# Patient Record
Sex: FEMALE | Race: WHITE | NOT HISPANIC OR LATINO
[De-identification: names, ages, dates, MRNs, and addresses within clinical notes are randomized per-mention and may not be internally consistent; named-entity substitution may affect disease eponyms.]

---

## 2017-02-27 ENCOUNTER — RESULT REVIEW (OUTPATIENT)
Age: 42
End: 2017-02-27

## 2017-02-27 ENCOUNTER — OUTPATIENT (OUTPATIENT)
Dept: OUTPATIENT SERVICES | Facility: HOSPITAL | Age: 42
LOS: 1 days | Discharge: HOME | End: 2017-02-27

## 2017-02-27 DIAGNOSIS — C50.919 MALIGNANT NEOPLASM OF UNSPECIFIED SITE OF UNSPECIFIED FEMALE BREAST: ICD-10-CM

## 2017-02-28 ENCOUNTER — APPOINTMENT (OUTPATIENT)
Dept: HEMATOLOGY ONCOLOGY | Facility: CLINIC | Age: 42
End: 2017-02-28

## 2017-02-28 LAB
HCT VFR BLD CALC: 39.9 % — SIGNIFICANT CHANGE UP (ref 34.5–45)
HGB BLD-MCNC: 14.1 G/DL — SIGNIFICANT CHANGE UP (ref 11.5–15.5)
MCHC RBC-ENTMCNC: 31.2 PG — SIGNIFICANT CHANGE UP (ref 27–34)
MCHC RBC-ENTMCNC: 35.3 G/DL — SIGNIFICANT CHANGE UP (ref 32–36)
MCV RBC AUTO: 88.4 FL — SIGNIFICANT CHANGE UP (ref 80–100)
PLATELET # BLD AUTO: 280 K/UL — SIGNIFICANT CHANGE UP (ref 150–400)
RBC # BLD: 4.51 M/UL — SIGNIFICANT CHANGE UP (ref 3.8–5.2)
RBC # FLD: 12 % — SIGNIFICANT CHANGE UP (ref 10.3–14.5)
WBC # BLD: 5.4 K/UL — SIGNIFICANT CHANGE UP (ref 3.8–10.5)
WBC # FLD AUTO: 5.4 K/UL — SIGNIFICANT CHANGE UP (ref 3.8–10.5)

## 2017-04-04 ENCOUNTER — APPOINTMENT (OUTPATIENT)
Dept: HEMATOLOGY ONCOLOGY | Facility: CLINIC | Age: 42
End: 2017-04-04

## 2017-04-04 ENCOUNTER — OUTPATIENT (OUTPATIENT)
Dept: OUTPATIENT SERVICES | Facility: HOSPITAL | Age: 42
LOS: 1 days | Discharge: HOME | End: 2017-04-04

## 2017-04-04 ENCOUNTER — RESULT REVIEW (OUTPATIENT)
Age: 42
End: 2017-04-04

## 2017-04-04 ENCOUNTER — OTHER (OUTPATIENT)
Age: 42
End: 2017-04-04

## 2017-04-04 DIAGNOSIS — C50.919 MALIGNANT NEOPLASM OF UNSPECIFIED SITE OF UNSPECIFIED FEMALE BREAST: ICD-10-CM

## 2017-04-04 DIAGNOSIS — R74.8 ABNORMAL LEVELS OF OTHER SERUM ENZYMES: ICD-10-CM

## 2017-04-05 ENCOUNTER — APPOINTMENT (OUTPATIENT)
Dept: HEMATOLOGY ONCOLOGY | Facility: CLINIC | Age: 42
End: 2017-04-05

## 2017-04-05 ENCOUNTER — RESULT REVIEW (OUTPATIENT)
Age: 42
End: 2017-04-05

## 2017-04-05 ENCOUNTER — OTHER (OUTPATIENT)
Age: 42
End: 2017-04-05

## 2017-04-05 LAB
ALBUMIN SERPL ELPH-MCNC: 4.5 G/DL
ALP BLD-CCNC: 67 U/L
ALT SERPL-CCNC: 36 U/L
AST SERPL-CCNC: 24 U/L
BILIRUB DIRECT SERPL-MCNC: 0.1 MG/DL
BILIRUB INDIRECT SERPL-MCNC: 0.3 MG/DL
BILIRUB SERPL-MCNC: 0.4 MG/DL
HCT VFR BLD CALC: 39.2 % — SIGNIFICANT CHANGE UP (ref 34.5–45)
HGB BLD-MCNC: 13.8 G/DL — SIGNIFICANT CHANGE UP (ref 11.5–15.5)
MCHC RBC-ENTMCNC: 31.5 PG — SIGNIFICANT CHANGE UP (ref 27–34)
MCHC RBC-ENTMCNC: 35.3 G/DL — SIGNIFICANT CHANGE UP (ref 32–36)
MCV RBC AUTO: 89.4 FL — SIGNIFICANT CHANGE UP (ref 80–100)
PLATELET # BLD AUTO: 272 K/UL — SIGNIFICANT CHANGE UP (ref 150–400)
PROT SERPL-MCNC: 7.2 G/DL
RBC # BLD: 4.38 M/UL — SIGNIFICANT CHANGE UP (ref 3.8–5.2)
RBC # FLD: 11.1 % — SIGNIFICANT CHANGE UP (ref 10.3–14.5)
WBC # BLD: 5.3 K/UL — SIGNIFICANT CHANGE UP (ref 3.8–10.5)
WBC # FLD AUTO: 5.3 K/UL — SIGNIFICANT CHANGE UP (ref 3.8–10.5)

## 2017-04-14 ENCOUNTER — CLINICAL ADVICE (OUTPATIENT)
Age: 42
End: 2017-04-14

## 2017-07-06 ENCOUNTER — OUTPATIENT (OUTPATIENT)
Dept: OUTPATIENT SERVICES | Facility: HOSPITAL | Age: 42
LOS: 1 days | Discharge: HOME | End: 2017-07-06

## 2017-07-06 DIAGNOSIS — C50.919 MALIGNANT NEOPLASM OF UNSPECIFIED SITE OF UNSPECIFIED FEMALE BREAST: ICD-10-CM

## 2017-07-11 ENCOUNTER — APPOINTMENT (OUTPATIENT)
Dept: HEMATOLOGY ONCOLOGY | Facility: CLINIC | Age: 42
End: 2017-07-11

## 2017-07-11 VITALS
OXYGEN SATURATION: 100 % | DIASTOLIC BLOOD PRESSURE: 81 MMHG | BODY MASS INDEX: 26.03 KG/M2 | HEART RATE: 72 BPM | HEIGHT: 64.57 IN | RESPIRATION RATE: 16 BRPM | SYSTOLIC BLOOD PRESSURE: 121 MMHG | TEMPERATURE: 98.4 F | WEIGHT: 154.32 LBS

## 2017-11-15 ENCOUNTER — APPOINTMENT (OUTPATIENT)
Dept: OPHTHALMOLOGY | Facility: CLINIC | Age: 42
End: 2017-11-15

## 2017-12-18 ENCOUNTER — RESULT REVIEW (OUTPATIENT)
Age: 42
End: 2017-12-18

## 2017-12-19 ENCOUNTER — APPOINTMENT (OUTPATIENT)
Dept: OBGYN | Facility: CLINIC | Age: 42
End: 2017-12-19
Payer: COMMERCIAL

## 2017-12-19 PROCEDURE — 99396 PREV VISIT EST AGE 40-64: CPT

## 2018-01-12 ENCOUNTER — OUTPATIENT (OUTPATIENT)
Dept: OUTPATIENT SERVICES | Facility: HOSPITAL | Age: 43
LOS: 1 days | Discharge: HOME | End: 2018-01-12

## 2018-01-12 DIAGNOSIS — C50.919 MALIGNANT NEOPLASM OF UNSPECIFIED SITE OF UNSPECIFIED FEMALE BREAST: ICD-10-CM

## 2018-01-16 ENCOUNTER — RX RENEWAL (OUTPATIENT)
Age: 43
End: 2018-01-16

## 2018-01-19 ENCOUNTER — APPOINTMENT (OUTPATIENT)
Dept: OPHTHALMOLOGY | Facility: CLINIC | Age: 43
End: 2018-01-19
Payer: COMMERCIAL

## 2018-01-19 DIAGNOSIS — H04.123 DRY EYE SYNDROME OF BILATERAL LACRIMAL GLANDS: ICD-10-CM

## 2018-01-19 PROCEDURE — 92014 COMPRE OPH EXAM EST PT 1/>: CPT

## 2018-01-23 ENCOUNTER — APPOINTMENT (OUTPATIENT)
Dept: HEMATOLOGY ONCOLOGY | Facility: CLINIC | Age: 43
End: 2018-01-23
Payer: COMMERCIAL

## 2018-01-23 VITALS
SYSTOLIC BLOOD PRESSURE: 100 MMHG | TEMPERATURE: 98.7 F | BODY MASS INDEX: 26.4 KG/M2 | RESPIRATION RATE: 16 BRPM | DIASTOLIC BLOOD PRESSURE: 70 MMHG | WEIGHT: 156.53 LBS | HEART RATE: 68 BPM | OXYGEN SATURATION: 98 %

## 2018-01-23 PROCEDURE — 99215 OFFICE O/P EST HI 40 MIN: CPT

## 2018-03-09 ENCOUNTER — OUTPATIENT (OUTPATIENT)
Dept: OUTPATIENT SERVICES | Facility: HOSPITAL | Age: 43
LOS: 1 days | Discharge: HOME | End: 2018-03-09

## 2018-03-09 DIAGNOSIS — C50.919 MALIGNANT NEOPLASM OF UNSPECIFIED SITE OF UNSPECIFIED FEMALE BREAST: ICD-10-CM

## 2018-03-12 ENCOUNTER — APPOINTMENT (OUTPATIENT)
Dept: HEMATOLOGY ONCOLOGY | Facility: CLINIC | Age: 43
End: 2018-03-12

## 2018-03-20 ENCOUNTER — APPOINTMENT (OUTPATIENT)
Dept: HEMATOLOGY ONCOLOGY | Facility: CLINIC | Age: 43
End: 2018-03-20

## 2018-03-20 ENCOUNTER — RESULT REVIEW (OUTPATIENT)
Age: 43
End: 2018-03-20

## 2018-03-20 ENCOUNTER — LABORATORY RESULT (OUTPATIENT)
Age: 43
End: 2018-03-20

## 2018-03-20 LAB
HCT VFR BLD CALC: 40.9 % — SIGNIFICANT CHANGE UP (ref 34.5–45)
HGB BLD-MCNC: 14.8 G/DL — SIGNIFICANT CHANGE UP (ref 11.5–15.5)
MCHC RBC-ENTMCNC: 31.5 PG — SIGNIFICANT CHANGE UP (ref 27–34)
MCHC RBC-ENTMCNC: 36.2 G/DL — HIGH (ref 32–36)
MCV RBC AUTO: 87 FL — SIGNIFICANT CHANGE UP (ref 80–100)
PLATELET # BLD AUTO: 270 K/UL — SIGNIFICANT CHANGE UP (ref 150–400)
RBC # BLD: 4.7 M/UL — SIGNIFICANT CHANGE UP (ref 3.8–5.2)
RBC # FLD: 11.1 % — SIGNIFICANT CHANGE UP (ref 10.3–14.5)
WBC # BLD: 4.8 K/UL — SIGNIFICANT CHANGE UP (ref 3.8–10.5)
WBC # FLD AUTO: 4.8 K/UL — SIGNIFICANT CHANGE UP (ref 3.8–10.5)

## 2018-03-26 ENCOUNTER — RESULT REVIEW (OUTPATIENT)
Age: 43
End: 2018-03-26

## 2018-07-18 ENCOUNTER — APPOINTMENT (OUTPATIENT)
Dept: HEMATOLOGY ONCOLOGY | Facility: CLINIC | Age: 43
End: 2018-07-18

## 2018-08-02 ENCOUNTER — OUTPATIENT (OUTPATIENT)
Dept: OUTPATIENT SERVICES | Facility: HOSPITAL | Age: 43
LOS: 1 days | Discharge: HOME | End: 2018-08-02

## 2018-08-02 DIAGNOSIS — C50.919 MALIGNANT NEOPLASM OF UNSPECIFIED SITE OF UNSPECIFIED FEMALE BREAST: ICD-10-CM

## 2018-08-10 ENCOUNTER — APPOINTMENT (OUTPATIENT)
Dept: HEMATOLOGY ONCOLOGY | Facility: CLINIC | Age: 43
End: 2018-08-10
Payer: COMMERCIAL

## 2018-08-10 VITALS
WEIGHT: 137.79 LBS | HEART RATE: 68 BPM | OXYGEN SATURATION: 99 % | DIASTOLIC BLOOD PRESSURE: 70 MMHG | BODY MASS INDEX: 23.24 KG/M2 | SYSTOLIC BLOOD PRESSURE: 106 MMHG | RESPIRATION RATE: 16 BRPM

## 2018-08-10 DIAGNOSIS — M25.461 EFFUSION, RIGHT KNEE: ICD-10-CM

## 2018-08-10 DIAGNOSIS — M25.50 PAIN IN UNSPECIFIED JOINT: ICD-10-CM

## 2018-08-10 PROCEDURE — 99215 OFFICE O/P EST HI 40 MIN: CPT

## 2018-08-20 ENCOUNTER — TRANSCRIPTION ENCOUNTER (OUTPATIENT)
Age: 43
End: 2018-08-20

## 2018-08-31 ENCOUNTER — APPOINTMENT (OUTPATIENT)
Dept: MRI IMAGING | Facility: IMAGING CENTER | Age: 43
End: 2018-08-31
Payer: COMMERCIAL

## 2018-08-31 ENCOUNTER — OUTPATIENT (OUTPATIENT)
Dept: OUTPATIENT SERVICES | Facility: HOSPITAL | Age: 43
LOS: 1 days | End: 2018-08-31
Payer: COMMERCIAL

## 2018-08-31 DIAGNOSIS — M67.461 GANGLION, RIGHT KNEE: ICD-10-CM

## 2018-08-31 DIAGNOSIS — S83.271A COMPLEX TEAR OF LATERAL MENISCUS, CURRENT INJURY, RIGHT KNEE, INITIAL ENCOUNTER: ICD-10-CM

## 2018-08-31 DIAGNOSIS — Z00.8 ENCOUNTER FOR OTHER GENERAL EXAMINATION: ICD-10-CM

## 2018-08-31 PROCEDURE — 73721 MRI JNT OF LWR EXTRE W/O DYE: CPT

## 2018-08-31 PROCEDURE — 73721 MRI JNT OF LWR EXTRE W/O DYE: CPT | Mod: 26,RT

## 2019-01-14 ENCOUNTER — RX RENEWAL (OUTPATIENT)
Age: 44
End: 2019-01-14

## 2019-01-29 ENCOUNTER — APPOINTMENT (OUTPATIENT)
Dept: OBGYN | Facility: CLINIC | Age: 44
End: 2019-01-29

## 2019-02-20 ENCOUNTER — OUTPATIENT (OUTPATIENT)
Dept: OUTPATIENT SERVICES | Facility: HOSPITAL | Age: 44
LOS: 1 days | Discharge: HOME | End: 2019-02-20

## 2019-02-20 DIAGNOSIS — C50.919 MALIGNANT NEOPLASM OF UNSPECIFIED SITE OF UNSPECIFIED FEMALE BREAST: ICD-10-CM

## 2019-02-25 ENCOUNTER — APPOINTMENT (OUTPATIENT)
Dept: OPHTHALMOLOGY | Facility: CLINIC | Age: 44
End: 2019-02-25

## 2019-02-28 ENCOUNTER — APPOINTMENT (OUTPATIENT)
Dept: HEMATOLOGY ONCOLOGY | Facility: CLINIC | Age: 44
End: 2019-02-28
Payer: COMMERCIAL

## 2019-02-28 VITALS
SYSTOLIC BLOOD PRESSURE: 108 MMHG | DIASTOLIC BLOOD PRESSURE: 76 MMHG | WEIGHT: 139.99 LBS | OXYGEN SATURATION: 97 % | RESPIRATION RATE: 16 BRPM | HEART RATE: 83 BPM | BODY MASS INDEX: 23.61 KG/M2 | TEMPERATURE: 97.8 F

## 2019-02-28 DIAGNOSIS — Z79.899 OTHER LONG TERM (CURRENT) DRUG THERAPY: ICD-10-CM

## 2019-02-28 DIAGNOSIS — R53.83 OTHER FATIGUE: ICD-10-CM

## 2019-02-28 DIAGNOSIS — R73.09 OTHER ABNORMAL GLUCOSE: ICD-10-CM

## 2019-02-28 PROCEDURE — 99215 OFFICE O/P EST HI 40 MIN: CPT

## 2019-02-28 NOTE — REVIEW OF SYSTEMS
[Joint Pain] : joint pain [Insomnia] : insomnia [Negative] : Allergic/Immunologic [FreeTextEntry2] : Energy level normal. Had flu vaccination 9/2018. [FreeTextEntry3] : Supposed to see ophthalmologist q 12 months for melanoma screening. Last eye exam 1/19/2018. [FreeTextEntry7] : Last colonoscopy 04/26/15, biopsy showed lymphoid follicle, no polyp. [FreeTextEntry8] : Saw  GYN Dr Borrego 12/19/2017.l Denies vaginal spotting/bleeding. [FreeTextEntry9] : BMD 9/13/2016, normal and unchanged.   Joint pain "livable"  on gabapentin 200 mg/day and tumeric. Aware of "bump" on side of right knee x 1-2 months, initally was painful. [de-identified] : Last skin exam  1/2018. [de-identified] : Has difficulty falling asleep, slightly improved on gabapentin..

## 2019-02-28 NOTE — HISTORY OF PRESENT ILLNESS
[Disease: _____________________] : Disease: [unfilled] [T: ___] : T[unfilled] [N: ___] : N[unfilled] [M: ___] : M[unfilled] [AJCC Stage: ____] : AJCC Stage: [unfilled] [Treatment Protocol] : Treatment Protocol [de-identified] : The patient presented in 2009, at age 34, with a left breast mass BSE.\par \par \par \par Ultimately the patient underwent ultrasound guided core biopsy of  2 left breast masses which were positive for infiltrating ductal carcinoma.\par \par \par \par The patient was referred for genetic counseling and was found to have a deleterious BRCA2 mutation [id number] (5873C> A).\par \par \par \par On November 6, 2009 Dr. Susan Palleschi performed bilateral mastectomies, right sentinel lymph node biopsy and left axillary lymph node dissection. This was followed by placement of saline expanders by Dr. Jacoby Mistry and bilateral salpingo-oophorectomy by Dr. Donald Borrego.\par \par \par \par Pathology from the left breast demonstrated a 4.0 x 1.5 cm carcinoma. 90% was invasive ductal carcinoma with [place] score of 7/9. The carcinoma extended to within 0.2 cm from the anterior margin. The cancer was estrogen receptor positive (95%) progesterone receptor positive (90%) and HER-2/cheli negative (1+). One of 22 axillary lymph nodes was involved with metastatic disease measuring 1 x 0.4 cm.\par \par \par \par The right breast and 6 right axillary sentinel lymph nodes were negative for cancer.\par \par \par \par The patient's ovaries and fallopian tubes were negative for cancer.\par \par \par \par Postoperatively the patient received adjuvant chemotherapy. She received 4 cycles of dose dense AC extending from 12/21/2009 through 2/2/2010. Her cumulative dose of Adriamycin was 384 mg (240 mg per meter squared). This was followed by 4 cycles of dose dense Taxol extending from 2/16/2010 through March 30, 2010.\par \par \par \par The patient participated in SWOG protocol  and was randomized to receive Zometa which she initiated in January 2010. She received 6 monthly cycles of Zometa followed by Zometa Q 6 months to complete 3 years of therapy.\par \par \par \par After completion of chemotherapy she received radiation therapy to under the supervision of Dr Shu Card.\par \par \par \par The patient initiated therapy with aromatase inhibitors on June 24, 2010. She initially received anastrozole but has currently been taking exemestane.\par \par \par \par On February 29, 2012 the patient had her implants removed and had bilateral NATANAEL flap reconstructions.\par \par  [de-identified] : Infiltrating ductal carcinoma, ER+ SC+ Her2 cheli - [FreeTextEntry1] : S/P Bilateral mastectomies/Left ALND 11/2009, S/P Dose Dense AC x 4 from 12/21/2009 - 02/02/2010. S/P Taxol x 4 from 02/16/2010 - 03/30/2010, S/P Radiation therapy  05/2010 - 06/2010.  AI's from 06/24/2010 to present,  currently on exemestane. \par  (Zometa Q month x 6 then Q 6 months to complete 3 years of Rx) started 1/29/2010 \par t. [de-identified] : The patient completed chemotherapy (AC- Taxol ) 8  years 11 months ago. \par  \par The patient has been on AI's for 8   years 8 months, currently exemestane\par \par Saw Dr Palleschi approximately 2-3 weeks ago, exam OK. will follow up in 6 months.\par \par Had eye exam with ophthalmologist Dr Lam 1/19/2018. Plans to reschedule appointment.\par \par Last saw dermatologist Dr Skinner 1/2018. Has not been back since then.\par \par Cancelled appointment with PCP Dr Mckeon 8/2018 rescheduled for 4/2019..\par \par Saw orthopedist for bump on right knee and was found to have torn meniscus and joint effusion. No therapy indicated at present.\par \par No other significant medical events since last visit.\par \par

## 2019-02-28 NOTE — PHYSICAL EXAM
[Fully active, able to carry on all pre-disease performance without restriction] : Status 0 - Fully active, able to carry on all pre-disease performance without restriction [Normal] : affect appropriate [de-identified] : Bilateral mastectomies with NATANAEL flap reconstructions, telangiectasias left chest wall, no masses either side. [de-identified] : NATANAEL flap scar [de-identified] : 2 cm hard swelling lateral right knee, unchanged [de-identified] : Tattoo right shoulder, telangiectasias left chest wall.

## 2019-03-07 ENCOUNTER — RESULT REVIEW (OUTPATIENT)
Age: 44
End: 2019-03-07

## 2019-03-07 ENCOUNTER — APPOINTMENT (OUTPATIENT)
Dept: HEMATOLOGY ONCOLOGY | Facility: CLINIC | Age: 44
End: 2019-03-07

## 2019-03-07 LAB
25(OH)D3 SERPL-MCNC: 40 NG/ML
ALBUMIN SERPL ELPH-MCNC: 4.7 G/DL
ALP BLD-CCNC: 59 U/L
ALT SERPL-CCNC: 26 U/L
ANION GAP SERPL CALC-SCNC: 10 MMOL/L
AST SERPL-CCNC: 21 U/L
BASOPHILS # BLD AUTO: 0.1 K/UL — SIGNIFICANT CHANGE UP (ref 0–0.2)
BASOPHILS NFR BLD AUTO: 1 % — SIGNIFICANT CHANGE UP (ref 0–2)
BILIRUB SERPL-MCNC: 0.3 MG/DL
BUN SERPL-MCNC: 26 MG/DL
CALCIUM SERPL-MCNC: 10.2 MG/DL
CHLORIDE SERPL-SCNC: 103 MMOL/L
CHOLEST SERPL-MCNC: 160 MG/DL
CHOLEST/HDLC SERPL: 3.2 RATIO
CO2 SERPL-SCNC: 26 MMOL/L
CREAT SERPL-MCNC: 0.84 MG/DL
EOSINOPHIL # BLD AUTO: 0.1 K/UL — SIGNIFICANT CHANGE UP (ref 0–0.5)
EOSINOPHIL NFR BLD AUTO: 2.6 % — SIGNIFICANT CHANGE UP (ref 0–6)
GLUCOSE SERPL-MCNC: 96 MG/DL
HBA1C MFR BLD HPLC: 5.2 %
HCT VFR BLD CALC: 39.6 % — SIGNIFICANT CHANGE UP (ref 34.5–45)
HDLC SERPL-MCNC: 50 MG/DL
HGB BLD-MCNC: 14.1 G/DL — SIGNIFICANT CHANGE UP (ref 11.5–15.5)
LDLC SERPL CALC-MCNC: 102 MG/DL
LYMPHOCYTES # BLD AUTO: 1.7 K/UL — SIGNIFICANT CHANGE UP (ref 1–3.3)
LYMPHOCYTES # BLD AUTO: 30.1 % — SIGNIFICANT CHANGE UP (ref 13–44)
MCHC RBC-ENTMCNC: 31.5 PG — SIGNIFICANT CHANGE UP (ref 27–34)
MCHC RBC-ENTMCNC: 35.6 G/DL — SIGNIFICANT CHANGE UP (ref 32–36)
MCV RBC AUTO: 88.6 FL — SIGNIFICANT CHANGE UP (ref 80–100)
MONOCYTES # BLD AUTO: 0.4 K/UL — SIGNIFICANT CHANGE UP (ref 0–0.9)
MONOCYTES NFR BLD AUTO: 7.6 % — SIGNIFICANT CHANGE UP (ref 2–14)
NEUTROPHILS # BLD AUTO: 3.3 K/UL — SIGNIFICANT CHANGE UP (ref 1.8–7.4)
NEUTROPHILS NFR BLD AUTO: 58.6 % — SIGNIFICANT CHANGE UP (ref 43–77)
PLATELET # BLD AUTO: 300 K/UL — SIGNIFICANT CHANGE UP (ref 150–400)
POTASSIUM SERPL-SCNC: 4.5 MMOL/L
PROT SERPL-MCNC: 7.1 G/DL
RBC # BLD: 4.47 M/UL — SIGNIFICANT CHANGE UP (ref 3.8–5.2)
RBC # FLD: 11.4 % — SIGNIFICANT CHANGE UP (ref 10.3–14.5)
SODIUM SERPL-SCNC: 139 MMOL/L
TRIGL SERPL-MCNC: 42 MG/DL
TSH SERPL-ACNC: 2.32 UIU/ML
VIT B12 SERPL-MCNC: 661 PG/ML
WBC # BLD: 5.7 K/UL — SIGNIFICANT CHANGE UP (ref 3.8–10.5)
WBC # FLD AUTO: 5.7 K/UL — SIGNIFICANT CHANGE UP (ref 3.8–10.5)

## 2019-03-12 ENCOUNTER — APPOINTMENT (OUTPATIENT)
Dept: OBGYN | Facility: CLINIC | Age: 44
End: 2019-03-12
Payer: COMMERCIAL

## 2019-03-12 ENCOUNTER — RESULT REVIEW (OUTPATIENT)
Age: 44
End: 2019-03-12

## 2019-03-12 PROCEDURE — 99396 PREV VISIT EST AGE 40-64: CPT

## 2019-06-25 ENCOUNTER — APPOINTMENT (OUTPATIENT)
Dept: RADIOLOGY | Facility: IMAGING CENTER | Age: 44
End: 2019-06-25

## 2019-07-24 ENCOUNTER — FORM ENCOUNTER (OUTPATIENT)
Age: 44
End: 2019-07-24

## 2019-07-25 ENCOUNTER — APPOINTMENT (OUTPATIENT)
Dept: RADIOLOGY | Facility: IMAGING CENTER | Age: 44
End: 2019-07-25
Payer: COMMERCIAL

## 2019-07-25 ENCOUNTER — OUTPATIENT (OUTPATIENT)
Dept: OUTPATIENT SERVICES | Facility: HOSPITAL | Age: 44
LOS: 1 days | End: 2019-07-25
Payer: COMMERCIAL

## 2019-07-25 DIAGNOSIS — Z00.8 ENCOUNTER FOR OTHER GENERAL EXAMINATION: ICD-10-CM

## 2019-07-25 PROCEDURE — 77080 DXA BONE DENSITY AXIAL: CPT

## 2019-07-25 PROCEDURE — 77080 DXA BONE DENSITY AXIAL: CPT | Mod: 26

## 2019-09-10 ENCOUNTER — OUTPATIENT (OUTPATIENT)
Dept: OUTPATIENT SERVICES | Facility: HOSPITAL | Age: 44
LOS: 1 days | Discharge: HOME | End: 2019-09-10

## 2019-09-10 DIAGNOSIS — C50.919 MALIGNANT NEOPLASM OF UNSPECIFIED SITE OF UNSPECIFIED FEMALE BREAST: ICD-10-CM

## 2019-09-13 ENCOUNTER — APPOINTMENT (OUTPATIENT)
Dept: HEMATOLOGY ONCOLOGY | Facility: CLINIC | Age: 44
End: 2019-09-13
Payer: COMMERCIAL

## 2019-09-13 VITALS
DIASTOLIC BLOOD PRESSURE: 81 MMHG | SYSTOLIC BLOOD PRESSURE: 119 MMHG | TEMPERATURE: 99.1 F | RESPIRATION RATE: 15 BRPM | OXYGEN SATURATION: 98 % | HEART RATE: 65 BPM | BODY MASS INDEX: 25.73 KG/M2 | WEIGHT: 152.56 LBS

## 2019-09-13 PROCEDURE — 99214 OFFICE O/P EST MOD 30 MIN: CPT

## 2019-09-13 NOTE — HISTORY OF PRESENT ILLNESS
[Disease: _____________________] : Disease: [unfilled] [T: ___] : T[unfilled] [N: ___] : N[unfilled] [M: ___] : M[unfilled] [AJCC Stage: ____] : AJCC Stage: [unfilled] [Treatment Protocol] : Treatment Protocol [de-identified] : The patient presented in 2009, at age 34, with a left breast mass BSE.\par \par \par \par Ultimately the patient underwent ultrasound guided core biopsy of  2 left breast masses which were positive for infiltrating ductal carcinoma.\par \par \par \par The patient was referred for genetic counseling and was found to have a deleterious BRCA2 mutation [id number] (5873C> A).\par \par \par \par On November 6, 2009 Dr. Susan Palleschi performed bilateral mastectomies, right sentinel lymph node biopsy and left axillary lymph node dissection. This was followed by placement of saline expanders by Dr. Jacoby Mistry and bilateral salpingo-oophorectomy by Dr. Donald Borrego.\par \par \par \par Pathology from the left breast demonstrated a 4.0 x 1.5 cm carcinoma. 90% was invasive ductal carcinoma with [place] score of 7/9. The carcinoma extended to within 0.2 cm from the anterior margin. The cancer was estrogen receptor positive (95%) progesterone receptor positive (90%) and HER-2/cheli negative (1+). One of 22 axillary lymph nodes was involved with metastatic disease measuring 1 x 0.4 cm.\par \par \par \par The right breast and 6 right axillary sentinel lymph nodes were negative for cancer.\par \par \par \par The patient's ovaries and fallopian tubes were negative for cancer.\par \par \par \par Postoperatively the patient received adjuvant chemotherapy. She received 4 cycles of dose dense AC extending from 12/21/2009 through 2/2/2010. Her cumulative dose of Adriamycin was 384 mg (240 mg per meter squared). This was followed by 4 cycles of dose dense Taxol extending from 2/16/2010 through March 30, 2010.\par \par \par \par The patient participated in SWOG protocol  and was randomized to receive Zometa which she initiated in January 2010. She received 6 monthly cycles of Zometa followed by Zometa Q 6 months to complete 3 years of therapy.\par \par \par \par After completion of chemotherapy she received radiation therapy to under the supervision of Dr Shu Card.\par \par \par \par The patient initiated therapy with aromatase inhibitors on June 24, 2010. She initially received anastrozole but has currently been taking exemestane.\par \par \par \par On February 29, 2012 the patient had her implants removed and had bilateral NATANAEL flap reconstructions.\par \par  [de-identified] : Infiltrating ductal carcinoma, ER+ OR+ Her2 cheli - [FreeTextEntry1] : S/P Bilateral mastectomies/Left ALND 11/2009, S/P Dose Dense AC x 4 from 12/21/2009 - 02/02/2010. S/P Taxol x 4 from 02/16/2010 - 03/30/2010, S/P Radiation therapy  05/2010 - 06/2010.  AI's from 06/24/2010 to present,  currently on exemestane. \par  (Zometa Q month x 6 then Q 6 months to complete 3 years of Rx) started 1/29/2010 \par t. [de-identified] : The patient completed chemotherapy (AC- Taxol ) 9  years  5  months ago. \par \par  The patient has been on AI's for 9  years 3  months, currently exemestane\par \par Saw Dr Palleschi  approximately  02/2019, exam OK. \par \par Had eye exam with ophthalmologist Dr Lam 1/19/2018. Next f/u 10/2019.\par \par Last saw dermatologist Dr Skinner 1/2019. Has not been back since then.\par \par The patient states she is scheduled for her yearly with PCP Dr Mckeon 11/2019.\par \par \par Overall the patient states she feels well since last seen.\par \par No other significant medical events since last visit.\par \par

## 2019-09-13 NOTE — PHYSICAL EXAM
[Fully active, able to carry on all pre-disease performance without restriction] : Status 0 - Fully active, able to carry on all pre-disease performance without restriction [Normal] : affect appropriate [de-identified] : Bilateral mastectomies with NATANAEL flap reconstructions, telangiectasias left chest wall, no masses either side. [de-identified] : NATANAEL flap scar [de-identified] : 2 cm hard swelling lateral right knee, unchanged [de-identified] : Tattoo right shoulder, telangiectasias left chest wall.

## 2019-09-13 NOTE — REVIEW OF SYSTEMS
[Joint Pain] : joint pain [Insomnia] : insomnia [Negative] : Heme/Lymph [FreeTextEntry2] : Energy level normal. Had flu vaccination 9/2018. [FreeTextEntry3] : Supposed to see ophthalmologist q 12 months for melanoma screening.. Next  up 10/2019 [FreeTextEntry7] : Last colonoscopy 04/26/15, biopsy showed lymphoid follicle, no polyp. Next f/u 10/09/2019 with Dr Amanda Foley. Appetite is good. [FreeTextEntry8] : Saw  GYN   spring 2019, exam with benign findings. Denies vaginal spotting/bleeding. [FreeTextEntry9] : BMD 9/13/2016, normal and unchanged.   Joint pain "livable"  on gabapentin 200 mg/day and tumeric.Perisitent "bump" on side of right knee since 01/2019, no medical therapy indicated at this time. [de-identified] : Last saw dermatologist Dr Skinner  spring 2019, no lesion removed. [de-identified] : Has difficulty falling asleep

## 2019-09-18 ENCOUNTER — APPOINTMENT (OUTPATIENT)
Dept: OPHTHALMOLOGY | Facility: CLINIC | Age: 44
End: 2019-09-18

## 2019-12-23 ENCOUNTER — RX RENEWAL (OUTPATIENT)
Age: 44
End: 2019-12-23

## 2020-02-12 ENCOUNTER — NON-APPOINTMENT (OUTPATIENT)
Age: 45
End: 2020-02-12

## 2020-02-12 ENCOUNTER — APPOINTMENT (OUTPATIENT)
Dept: OPHTHALMOLOGY | Facility: CLINIC | Age: 45
End: 2020-02-12
Payer: COMMERCIAL

## 2020-02-12 PROCEDURE — 92012 INTRM OPH EXAM EST PATIENT: CPT

## 2020-03-19 ENCOUNTER — APPOINTMENT (OUTPATIENT)
Dept: OBGYN | Facility: CLINIC | Age: 45
End: 2020-03-19

## 2020-04-25 ENCOUNTER — MESSAGE (OUTPATIENT)
Age: 45
End: 2020-04-25

## 2020-05-05 ENCOUNTER — APPOINTMENT (OUTPATIENT)
Dept: DISASTER EMERGENCY | Facility: CLINIC | Age: 45
End: 2020-05-05

## 2020-05-06 LAB
SARS-COV-2 IGG SERPL IA-ACNC: 0 INDEX
SARS-COV-2 IGG SERPL QL IA: NEGATIVE

## 2020-05-26 ENCOUNTER — OUTPATIENT (OUTPATIENT)
Dept: OUTPATIENT SERVICES | Facility: HOSPITAL | Age: 45
LOS: 1 days | Discharge: HOME | End: 2020-05-26

## 2020-05-26 ENCOUNTER — APPOINTMENT (OUTPATIENT)
Dept: HEMATOLOGY ONCOLOGY | Facility: CLINIC | Age: 45
End: 2020-05-26
Payer: COMMERCIAL

## 2020-05-26 VITALS
TEMPERATURE: 98.2 F | OXYGEN SATURATION: 98 % | BODY MASS INDEX: 24.84 KG/M2 | DIASTOLIC BLOOD PRESSURE: 90 MMHG | RESPIRATION RATE: 16 BRPM | WEIGHT: 147.27 LBS | HEART RATE: 70 BPM | SYSTOLIC BLOOD PRESSURE: 129 MMHG

## 2020-05-26 DIAGNOSIS — R22.2 LOCALIZED SWELLING, MASS AND LUMP, TRUNK: ICD-10-CM

## 2020-05-26 DIAGNOSIS — C50.919 MALIGNANT NEOPLASM OF UNSPECIFIED SITE OF UNSPECIFIED FEMALE BREAST: ICD-10-CM

## 2020-05-26 PROCEDURE — 99214 OFFICE O/P EST MOD 30 MIN: CPT

## 2020-05-26 NOTE — REASON FOR VISIT
[Urgent Visit] : an urgent  [FreeTextEntry2] : Left breast cancer., BRCA 2 mutation, felt chest wall lump last night

## 2020-05-26 NOTE — HISTORY OF PRESENT ILLNESS
[Disease: _____________________] : Disease: [unfilled] [T: ___] : T[unfilled] [N: ___] : N[unfilled] [M: ___] : M[unfilled] [AJCC Stage: ____] : AJCC Stage: [unfilled] [Treatment Protocol] : Treatment Protocol [de-identified] : The patient presented in 2009, at age 34, with a left breast mass BSE.\par \par \par \par Ultimately the patient underwent ultrasound guided core biopsy of  2 left breast masses which were positive for infiltrating ductal carcinoma.\par \par \par \par The patient was referred for genetic counseling and was found to have a deleterious BRCA2 mutation [id number] (5873C> A).\par \par \par \par On November 6, 2009 Dr. Susan Palleschi performed bilateral mastectomies, right sentinel lymph node biopsy and left axillary lymph node dissection. This was followed by placement of saline expanders by Dr. Jacoby Mistry and bilateral salpingo-oophorectomy by Dr. Donald Borrego.\par \par \par \par Pathology from the left breast demonstrated a 4.0 x 1.5 cm carcinoma. 90% was invasive ductal carcinoma with [place] score of 7/9. The carcinoma extended to within 0.2 cm from the anterior margin. The cancer was estrogen receptor positive (95%) progesterone receptor positive (90%) and HER-2/cheli negative (1+). One of 22 axillary lymph nodes was involved with metastatic disease measuring 1 x 0.4 cm.\par \par \par \par The right breast and 6 right axillary sentinel lymph nodes were negative for cancer.\par \par \par \par The patient's ovaries and fallopian tubes were negative for cancer.\par \par \par \par Postoperatively the patient received adjuvant chemotherapy. She received 4 cycles of dose dense AC extending from 12/21/2009 through 2/2/2010. Her cumulative dose of Adriamycin was 384 mg (240 mg per meter squared). This was followed by 4 cycles of dose dense Taxol extending from 2/16/2010 through March 30, 2010.\par \par \par \par The patient participated in SWOG protocol  and was randomized to receive Zometa which she initiated in January 2010. She received 6 monthly cycles of Zometa followed by Zometa Q 6 months to complete 3 years of therapy.\par \par \par \par After completion of chemotherapy she received radiation therapy to under the supervision of Dr Shu Card.\par \par \par \par The patient initiated therapy with aromatase inhibitors on June 24, 2010. She initially received anastrozole but has currently been taking exemestane.\par \par \par \par On February 29, 2012 the patient had her implants removed and had bilateral NATANAEL flap reconstructions.\par \par  [de-identified] : Moderately differentiated infiltrating ductal carcinoma, ER+ [place]+ Her2 cheli - [FreeTextEntry1] : S/P Bilateral mastectomies/Left ALND 11/2009, S/P Dose Dense AC x 4 from 12/21/2009 - 02/02/2010. S/P Taxol x 4 from 02/16/2010 - 03/30/2010, S/P Radiation therapy  05/2010 - 06/2010.  AI's from 06/24/2010 to present,  currently on exemestane. \par  (Zometa Q month x 6 then Q 6 months to complete 3 years of Rx) started 1/29/2010 \par t. [de-identified] : The patient completed chemotherapy (AC- Taxol ) 10 years  1  month ago. \par \par  The patient has been on AI's for 9  years 11  months, currently exemestane\par \par Saw Dr Palleschi  approximately  11/2019, exam OK. \par \par Felt a small"pimple" in left inframammary crease last night, was unable to see it in mirror.\par \par Had eye exam with ophthalmologist Dr Lam 2/12/2020.\par \par Last saw dermatologist Dr Skinner 5 months ago, has follow up scheduled for 7/2020.\par \par The never had her yearly exam with PCP Dr Mckeon, it was postponed.\par \par No other significant medical events since last visit.\par \par

## 2020-05-26 NOTE — REVIEW OF SYSTEMS
[Joint Pain] : joint pain [Insomnia] : insomnia [Negative] : Allergic/Immunologic [FreeTextEntry2] : Energy level normal. Had flu vaccination 9/2019. [FreeTextEntry3] : Sees ophthalmologist q 12 months for melanoma screening .Most recent exam was 2/2020. [FreeTextEntry7] : Last colonoscopy late 2019. I will request report from Dr Amanda Chandler. [FreeTextEntry9] : BMD 9/13/2016, normal and unchanged.    [FreeTextEntry8] : Saw  GYN   spring 2019, exam with benign findings. 2020 deferred because of Covid 19 pandemic. Denies vaginal spotting/bleeding. [de-identified] : Last saw dermatologist Dr Skinner 1/2020, has appointment scheduled for 7/2020. [de-identified] : Neuropathy resolved [de-identified] : Has difficulty falling asleep

## 2020-05-26 NOTE — PHYSICAL EXAM
[Fully active, able to carry on all pre-disease performance without restriction] : Status 0 - Fully active, able to carry on all pre-disease performance without restriction [Normal] : full range of motion and no deformities appreciated [de-identified] : Bilateral mastectomies with NATANAEL flap reconstructions, telangiectasias left chest wall, comedone at site of chest wall "lump", left "inframammary crease". [de-identified] : NATANAEL flap scar [de-identified] : Tattoo right shoulder, telangiectasias left chest wall.

## 2020-06-08 ENCOUNTER — APPOINTMENT (OUTPATIENT)
Dept: HEMATOLOGY ONCOLOGY | Facility: CLINIC | Age: 45
End: 2020-06-08

## 2020-06-16 ENCOUNTER — APPOINTMENT (OUTPATIENT)
Dept: HEMATOLOGY ONCOLOGY | Facility: CLINIC | Age: 45
End: 2020-06-16

## 2020-09-23 ENCOUNTER — RESULT REVIEW (OUTPATIENT)
Age: 45
End: 2020-09-23

## 2020-09-23 ENCOUNTER — FORM ENCOUNTER (OUTPATIENT)
Age: 45
End: 2020-09-23

## 2020-09-24 ENCOUNTER — FORM ENCOUNTER (OUTPATIENT)
Age: 45
End: 2020-09-24

## 2020-09-24 ENCOUNTER — APPOINTMENT (OUTPATIENT)
Dept: OBGYN | Facility: CLINIC | Age: 45
End: 2020-09-24
Payer: COMMERCIAL

## 2020-09-24 PROCEDURE — 99396 PREV VISIT EST AGE 40-64: CPT

## 2021-02-19 ENCOUNTER — APPOINTMENT (OUTPATIENT)
Dept: OPHTHALMOLOGY | Facility: CLINIC | Age: 46
End: 2021-02-19

## 2021-05-11 ENCOUNTER — NON-APPOINTMENT (OUTPATIENT)
Age: 46
End: 2021-05-11

## 2021-05-11 ENCOUNTER — APPOINTMENT (OUTPATIENT)
Dept: FAMILY MEDICINE | Facility: CLINIC | Age: 46
End: 2021-05-11
Payer: COMMERCIAL

## 2021-05-11 VITALS
OXYGEN SATURATION: 98 % | HEIGHT: 64 IN | WEIGHT: 150 LBS | DIASTOLIC BLOOD PRESSURE: 80 MMHG | RESPIRATION RATE: 16 BRPM | HEART RATE: 70 BPM | BODY MASS INDEX: 25.61 KG/M2 | TEMPERATURE: 97.2 F | SYSTOLIC BLOOD PRESSURE: 116 MMHG

## 2021-05-11 DIAGNOSIS — Z13.220 ENCOUNTER FOR SCREENING FOR LIPOID DISORDERS: ICD-10-CM

## 2021-05-11 DIAGNOSIS — Z13.29 ENCOUNTER FOR SCREENING FOR OTHER SUSPECTED ENDOCRINE DISORDER: ICD-10-CM

## 2021-05-11 DIAGNOSIS — Z13.228 ENCOUNTER FOR SCREENING FOR OTHER SUSPECTED ENDOCRINE DISORDER: ICD-10-CM

## 2021-05-11 DIAGNOSIS — Z13.0 ENCOUNTER FOR SCREENING FOR OTHER SUSPECTED ENDOCRINE DISORDER: ICD-10-CM

## 2021-05-11 DIAGNOSIS — Z80.52 FAMILY HISTORY OF MALIGNANT NEOPLASM OF BLADDER: ICD-10-CM

## 2021-05-11 DIAGNOSIS — Z13.21 ENCOUNTER FOR SCREENING FOR NUTRITIONAL DISORDER: ICD-10-CM

## 2021-05-11 PROCEDURE — 93000 ELECTROCARDIOGRAM COMPLETE: CPT

## 2021-05-11 PROCEDURE — 96127 BRIEF EMOTIONAL/BEHAV ASSMT: CPT

## 2021-05-11 PROCEDURE — 99072 ADDL SUPL MATRL&STAF TM PHE: CPT

## 2021-05-11 PROCEDURE — 99386 PREV VISIT NEW AGE 40-64: CPT | Mod: 25

## 2021-05-11 RX ORDER — GABAPENTIN 100 MG/1
100 CAPSULE ORAL
Qty: 90 | Refills: 6 | Status: DISCONTINUED | COMMUNITY
Start: 2017-04-14 | End: 2021-05-11

## 2021-05-11 NOTE — HEALTH RISK ASSESSMENT
[No] : In the past 12 months have you used drugs other than those required for medical reasons? No [Patient reported PAP Smear was normal] : Patient reported PAP Smear was normal [Patient reported bone density results were normal] : Patient reported bone density results were normal [Patient reported colonoscopy was normal] : Patient reported colonoscopy was normal [] : No [de-identified] : Walks at times  [de-identified] : Diet is generally good- does enjoy snacking, chocolates  [MammogramComments] : bilateral mastectomy  [PapSmearDate] : 09/2020 [BoneDensityDate] : 07/2019 [ColonoscopyDate] : 2019

## 2021-05-11 NOTE — ASSESSMENT
[FreeTextEntry1] : Health care maintenance:\par check blood work, blood drawn in office\par follow up with optometry/ophthalmology and dentist for routine exams when due\par up to date with GYN- follow up when due\par improve upon diet and exercise\par \par HTN:\par stable\par c/w Lisinopril 2.5 mg daily\par

## 2021-05-11 NOTE — HISTORY OF PRESENT ILLNESS
[FreeTextEntry1] : Establish care- annual physical  [de-identified] : \par 45 year old female presents as a new patient to establish care, for annual physical \par \par H/o left breast cancer (infiltrating ductal carcinoma), BRCA positive- s/p bilateral mastectomy and breast flap reconstruction surgery, bilateral salpingo- oophorectomy, chemo, radiation, completed course of Exemestane\par \par has HTN- on low dose Lisinopril 2.5 mg for about 3 years\par feeling well on medication\par \par will look to f/u with GYN\par \par up to date with colonoscopy\par \par \par 3 children- ages 21 (son), 18 (daughter), 14 (son)\par \par

## 2021-05-17 LAB
25(OH)D3 SERPL-MCNC: 31.2 NG/ML
ALBUMIN SERPL ELPH-MCNC: 4.5 G/DL
ALP BLD-CCNC: 74 U/L
ALT SERPL-CCNC: 32 U/L
ANION GAP SERPL CALC-SCNC: 13 MMOL/L
APPEARANCE: CLEAR
AST SERPL-CCNC: 29 U/L
BACTERIA: NEGATIVE
BASOPHILS # BLD AUTO: 0.05 K/UL
BASOPHILS NFR BLD AUTO: 0.8 %
BILIRUB SERPL-MCNC: 0.4 MG/DL
BILIRUBIN URINE: NEGATIVE
BLOOD URINE: NEGATIVE
BUN SERPL-MCNC: 11 MG/DL
CALCIUM SERPL-MCNC: 10.2 MG/DL
CHLORIDE SERPL-SCNC: 105 MMOL/L
CHOLEST SERPL-MCNC: 188 MG/DL
CO2 SERPL-SCNC: 23 MMOL/L
COLOR: COLORLESS
CREAT SERPL-MCNC: 0.81 MG/DL
EOSINOPHIL # BLD AUTO: 0.12 K/UL
EOSINOPHIL NFR BLD AUTO: 2 %
ESTIMATED AVERAGE GLUCOSE: 100 MG/DL
FOLATE SERPL-MCNC: 8.7 NG/ML
GLUCOSE QUALITATIVE U: NEGATIVE
GLUCOSE SERPL-MCNC: 85 MG/DL
HBA1C MFR BLD HPLC: 5.1 %
HCT VFR BLD CALC: 42.3 %
HDLC SERPL-MCNC: 55 MG/DL
HGB BLD-MCNC: 14.5 G/DL
HYALINE CASTS: 0 /LPF
IMM GRANULOCYTES NFR BLD AUTO: 0.3 %
KETONES URINE: NEGATIVE
LDLC SERPL CALC-MCNC: 120 MG/DL
LEUKOCYTE ESTERASE URINE: NEGATIVE
LYMPHOCYTES # BLD AUTO: 1.81 K/UL
LYMPHOCYTES NFR BLD AUTO: 29.8 %
MAN DIFF?: NORMAL
MCHC RBC-ENTMCNC: 30.4 PG
MCHC RBC-ENTMCNC: 34.3 GM/DL
MCV RBC AUTO: 88.7 FL
MICROSCOPIC-UA: NORMAL
MONOCYTES # BLD AUTO: 0.5 K/UL
MONOCYTES NFR BLD AUTO: 8.2 %
NEUTROPHILS # BLD AUTO: 3.58 K/UL
NEUTROPHILS NFR BLD AUTO: 58.9 %
NITRITE URINE: NEGATIVE
NONHDLC SERPL-MCNC: 133 MG/DL
PH URINE: 5.5
PLATELET # BLD AUTO: 331 K/UL
POTASSIUM SERPL-SCNC: 4.4 MMOL/L
PROT SERPL-MCNC: 7.3 G/DL
PROTEIN URINE: NEGATIVE
RBC # BLD: 4.77 M/UL
RBC # FLD: 12.2 %
RED BLOOD CELLS URINE: 0 /HPF
SODIUM SERPL-SCNC: 141 MMOL/L
SPECIFIC GRAVITY URINE: 1.01
SQUAMOUS EPITHELIAL CELLS: 0 /HPF
T4 FREE SERPL-MCNC: 1.1 NG/DL
TRIGL SERPL-MCNC: 64 MG/DL
TSH SERPL-ACNC: 2.98 UIU/ML
URATE SERPL-MCNC: 4.8 MG/DL
UROBILINOGEN URINE: NORMAL
VIT B12 SERPL-MCNC: 722 PG/ML
WBC # FLD AUTO: 6.08 K/UL
WHITE BLOOD CELLS URINE: 1 /HPF

## 2021-05-19 ENCOUNTER — NON-APPOINTMENT (OUTPATIENT)
Age: 46
End: 2021-05-19

## 2021-05-21 ENCOUNTER — APPOINTMENT (OUTPATIENT)
Dept: OPHTHALMOLOGY | Facility: CLINIC | Age: 46
End: 2021-05-21

## 2021-05-26 ENCOUNTER — OUTPATIENT (OUTPATIENT)
Dept: OUTPATIENT SERVICES | Facility: HOSPITAL | Age: 46
LOS: 1 days | Discharge: HOME | End: 2021-05-26

## 2021-05-26 DIAGNOSIS — C50.919 MALIGNANT NEOPLASM OF UNSPECIFIED SITE OF UNSPECIFIED FEMALE BREAST: ICD-10-CM

## 2021-05-27 ENCOUNTER — APPOINTMENT (OUTPATIENT)
Dept: HEMATOLOGY ONCOLOGY | Facility: CLINIC | Age: 46
End: 2021-05-27
Payer: COMMERCIAL

## 2021-05-27 VITALS
RESPIRATION RATE: 16 BRPM | DIASTOLIC BLOOD PRESSURE: 71 MMHG | OXYGEN SATURATION: 98 % | HEART RATE: 86 BPM | TEMPERATURE: 97.2 F | SYSTOLIC BLOOD PRESSURE: 105 MMHG | BODY MASS INDEX: 26.38 KG/M2 | HEIGHT: 63.58 IN | WEIGHT: 150.77 LBS

## 2021-05-27 DIAGNOSIS — R23.4 CHANGES IN SKIN TEXTURE: ICD-10-CM

## 2021-05-27 PROCEDURE — 99072 ADDL SUPL MATRL&STAF TM PHE: CPT

## 2021-05-27 PROCEDURE — 99214 OFFICE O/P EST MOD 30 MIN: CPT

## 2021-06-11 ENCOUNTER — APPOINTMENT (OUTPATIENT)
Dept: OPHTHALMOLOGY | Facility: CLINIC | Age: 46
End: 2021-06-11
Payer: COMMERCIAL

## 2021-06-11 ENCOUNTER — NON-APPOINTMENT (OUTPATIENT)
Age: 46
End: 2021-06-11

## 2021-06-11 PROCEDURE — 99072 ADDL SUPL MATRL&STAF TM PHE: CPT

## 2021-06-11 PROCEDURE — 92014 COMPRE OPH EXAM EST PT 1/>: CPT

## 2021-10-07 ENCOUNTER — NON-APPOINTMENT (OUTPATIENT)
Age: 46
End: 2021-10-07

## 2021-10-07 DIAGNOSIS — Z78.9 OTHER SPECIFIED HEALTH STATUS: ICD-10-CM

## 2021-10-07 DIAGNOSIS — Z85.3 PERSONAL HISTORY OF MALIGNANT NEOPLASM OF BREAST: ICD-10-CM

## 2021-10-07 LAB — CYTOLOGY CVX/VAG DOC THIN PREP: NORMAL

## 2021-12-02 ENCOUNTER — APPOINTMENT (OUTPATIENT)
Dept: OBGYN | Facility: CLINIC | Age: 46
End: 2021-12-02
Payer: COMMERCIAL

## 2021-12-02 VITALS
WEIGHT: 150 LBS | OXYGEN SATURATION: 100 % | RESPIRATION RATE: 17 BRPM | DIASTOLIC BLOOD PRESSURE: 81 MMHG | HEART RATE: 91 BPM | SYSTOLIC BLOOD PRESSURE: 117 MMHG | BODY MASS INDEX: 25.61 KG/M2 | HEIGHT: 64 IN

## 2021-12-02 DIAGNOSIS — Z11.51 ENCOUNTER FOR SCREENING FOR HUMAN PAPILLOMAVIRUS (HPV): ICD-10-CM

## 2021-12-02 PROCEDURE — 99396 PREV VISIT EST AGE 40-64: CPT

## 2021-12-02 NOTE — END OF VISIT
[FreeTextEntry3] : Documented by Cele Reyes acting as scribe for Dr. Borrero on 12/02/2021 \par \par All Medical record entries made by the Scribe were at my, Dr. Borrero's, direction and personally dictated by me on 12/02/2021  . I have reviewed the chart and agree that the record accurately reflects my personal performance of the history, physical exam, assessment and plan. I have also personally directed, reviewed, and agreed with the discharge instructions.

## 2021-12-02 NOTE — PHYSICAL EXAM
[Appropriately responsive] : appropriately responsive [Alert] : alert [No Acute Distress] : no acute distress [Soft] : soft [Non-tender] : non-tender [Non-distended] : non-distended [No HSM] : No HSM [No Lesions] : no lesions [No Mass] : no mass [Examination Of The Breasts] : a normal appearance [No Masses] : no breast masses were palpable [Labia Majora] : normal [Labia Minora] : normal [Normal] : normal [Uterine Adnexae] : absent

## 2021-12-02 NOTE — HISTORY OF PRESENT ILLNESS
[Patient reported PAP Smear was normal] : Patient reported PAP Smear was normal [FreeTextEntry1] : 46 year old  female who presents today for an annual exam. Last seen 2020 with negative PAP and HPV. She had 3 prior c-sections. h/o breast CA, BRCA+ tx with bilateral mastectomy in  and oophorectomy. Patient has no complaints. Denies any new medical issues. She is UTD with her colonoscopy.\par \par  [PapSmeardate] : 9/2020 [TextBox_43] : UTD [HPVDate] : 9/2020 [TextBox_78] : negative

## 2021-12-19 ENCOUNTER — NON-APPOINTMENT (OUTPATIENT)
Age: 46
End: 2021-12-19

## 2021-12-19 ENCOUNTER — LABORATORY RESULT (OUTPATIENT)
Age: 46
End: 2021-12-19

## 2021-12-20 ENCOUNTER — APPOINTMENT (OUTPATIENT)
Dept: DERMATOLOGY | Facility: CLINIC | Age: 46
End: 2021-12-20
Payer: COMMERCIAL

## 2021-12-20 VITALS — WEIGHT: 150 LBS | HEIGHT: 64 IN | BODY MASS INDEX: 25.61 KG/M2

## 2021-12-20 DIAGNOSIS — T14.8XXA OTHER INJURY OF UNSPECIFIED BODY REGION, INITIAL ENCOUNTER: ICD-10-CM

## 2021-12-20 PROCEDURE — 11104 PUNCH BX SKIN SINGLE LESION: CPT

## 2021-12-20 PROCEDURE — 99203 OFFICE O/P NEW LOW 30 MIN: CPT | Mod: 25

## 2021-12-30 LAB — HPV HIGH+LOW RISK DNA PNL CVX: NOT DETECTED

## 2022-01-03 ENCOUNTER — APPOINTMENT (OUTPATIENT)
Dept: DERMATOLOGY | Facility: CLINIC | Age: 47
End: 2022-01-03
Payer: COMMERCIAL

## 2022-01-03 PROCEDURE — 99024 POSTOP FOLLOW-UP VISIT: CPT

## 2022-01-04 ENCOUNTER — NON-APPOINTMENT (OUTPATIENT)
Age: 47
End: 2022-01-04

## 2022-01-11 NOTE — HISTORY OF PRESENT ILLNESS
[Disease: _____________________] : Disease: [unfilled] [T: ___] : T[unfilled] [N: ___] : N[unfilled] [M: ___] : M[unfilled] [AJCC Stage: ____] : AJCC Stage: [unfilled] [Treatment Protocol] : Treatment Protocol [de-identified] : The patient presented in 2009, at age 34, with a left breast mass BSE.\par \par Ultimately the patient underwent ultrasound guided core biopsy of  2 left breast masses which were positive for infiltrating ductal carcinoma.\par \par On November 6, 2009 Dr. Susan Palleschi performed bilateral mastectomies, right sentinel lymph node biopsy and left axillary lymph node dissection. This was followed by placement of saline expanders by Dr. Jacoby Mistry and bilateral salpingo-oophorectomy by Dr. Donald Borrego.  The patient's ovaries and fallopian tubes were negative for cancer. \par \par Pathology from the left breast demonstrated a 4.0 x 1.5 cm carcinoma. 90% was invasive ductal carcinoma with [place] score of 7/9. The carcinoma extended to within 0.2 cm from the anterior margin. The cancer was estrogen receptor positive (95%) progesterone receptor positive (90%) and HER-2/cheli negative (1+). One of 22 axillary lymph nodes was involved with metastatic disease measuring 1 x 0.4 cm. The right breast and 6 right axillary sentinel lymph nodes were negative for cancer.\par \par Postoperatively the patient received adjuvant chemotherapy. She received 4 cycles of dose dense AC extending from 12/21/2009 through 2/2/2010. Her cumulative dose of Adriamycin was 384 mg (240 mg per meter squared). This was followed by 4 cycles of dose dense Taxol extending from 2/16/2010 through March 30, 2010.\par \par The patient participated in SWOG protocol  and was randomized to receive Zometa which she initiated in January 2010. She received 6 monthly cycles of Zometa followed by Zometa Q 6 months to complete 3 years of therapy.\par \par After completion of chemotherapy she received radiation therapy to under the supervision of Dr Shu Card. \par \par The patient initiated therapy with aromatase inhibitors on June 24, 2010.  Anastrazole switched to exemestane and pt completed 10 years of endocrine therapy 7/2020.\par \par On February 29, 2012 the patient had her implants removed and had bilateral NATANAEL flap reconstructions.\par \par The patient was referred for genetic counseling and was found to have a deleterious BRCA2 mutation [id number] (5873C> A). [de-identified] : Moderately differentiated infiltrating ductal carcinoma, ER+ MD+ Her2 cheli - [FreeTextEntry1] : S/P Bilateral mastectomies/Left ALND 11/2009, S/P Dose Dense AC x 4 from 12/21/2009 - 02/02/2010. S/P Taxol x 4 from 02/16/2010 - 03/30/2010, S/P Radiation therapy  05/2010 - 06/2010.  AI's from 06/24/2010 to present,  currently on exemestane. \par  (Zometa Q month x 6 then Q 6 months to complete 3 years of Rx) started 1/29/2010 \par t. [de-identified] : \par Transfer of care from Dr. Luz Soares. \par The patient has completed 10 years of endocrine tx with AI's.  Joint pains improving.\par Saw Dr Palleschi approximately 11/2019, exam OK. \par Felt a small "pimple" in left inframammary crease, saw dermatology, 1/2021, not concerned regarding lesion.  Pt reports no change in size and no new lesions.\par \par HEALTH MAINTENANCE\par PCP Dr. Radha Delgadillo saw 5/11/2021, HTN, eating well, energy level good\par Had eye exam with ophthalmologist Dr Lam 6/2021. Sees ophthalmologist q 12 months for melanoma screening. \par Last colonoscopy late 2019. I will request report from Dr Amanda Chandler.\par GYN Dr. Berna Borrero, PAP Smear: 09/2020] normal. \par Bone Density: 07/2019. normal. \par Colonoscopy: 2019 Dr. Amanda Pal, reportedly was normal.  h/o polyps\par Last saw dermatologist Dr Skinner 1/2021

## 2022-01-11 NOTE — PHYSICAL EXAM
[Fully active, able to carry on all pre-disease performance without restriction] : Status 0 - Fully active, able to carry on all pre-disease performance without restriction [Normal] : affect appropriate [de-identified] : Bilateral mastectomies with NATANAEL flap reconstructions, telangiectasias left chest wall, skin colored small 1mm comedone at site of chest wall "lump", left "inframammary crease". No axillary LAD [de-identified] : NATANAEL flap scar [de-identified] : Tattoo right shoulder, telangiectasias left chest wall.

## 2022-01-11 NOTE — ASSESSMENT
[FreeTextEntry1] : Moderately differentiated infiltrating ductal carcinoma left breast ER+ [place]+ Her 2 cheli -, T2N1M0, Pathologic prognostic stage IB (AJCC 8 edition), Status post bilateral mastectomy/left axillary lymph node dissection, Status post AC-Taxol, Status post RT, Status post Zometa x3 years (protocol )\par \par -completed 10 years of endocrine tx\par -on active surveillance, recommend twice yearly clinically breast exams\par -clinically DRAGAN\par -labs reviewed from 5/17/2021 - , other labs ok\par -discussed lifestyle modifications to help decrease risk of recurrence\par -up to date with screening\par -fu in 1 year

## 2022-03-21 ENCOUNTER — OUTPATIENT (OUTPATIENT)
Dept: OUTPATIENT SERVICES | Facility: HOSPITAL | Age: 47
LOS: 1 days | End: 2022-03-21
Payer: COMMERCIAL

## 2022-03-21 ENCOUNTER — APPOINTMENT (OUTPATIENT)
Dept: ULTRASOUND IMAGING | Facility: CLINIC | Age: 47
End: 2022-03-21
Payer: COMMERCIAL

## 2022-03-21 ENCOUNTER — APPOINTMENT (OUTPATIENT)
Dept: FAMILY MEDICINE | Facility: CLINIC | Age: 47
End: 2022-03-21
Payer: COMMERCIAL

## 2022-03-21 VITALS
HEART RATE: 74 BPM | HEIGHT: 64 IN | DIASTOLIC BLOOD PRESSURE: 82 MMHG | BODY MASS INDEX: 26.8 KG/M2 | WEIGHT: 157 LBS | TEMPERATURE: 97.6 F | SYSTOLIC BLOOD PRESSURE: 118 MMHG | OXYGEN SATURATION: 98 %

## 2022-03-21 DIAGNOSIS — M79.601 PAIN IN RIGHT ARM: ICD-10-CM

## 2022-03-21 PROCEDURE — 96127 BRIEF EMOTIONAL/BEHAV ASSMT: CPT

## 2022-03-21 PROCEDURE — 93971 EXTREMITY STUDY: CPT

## 2022-03-21 PROCEDURE — 99213 OFFICE O/P EST LOW 20 MIN: CPT | Mod: 25

## 2022-03-21 PROCEDURE — 93971 EXTREMITY STUDY: CPT | Mod: 26,RT

## 2022-03-21 NOTE — PHYSICAL EXAM
[No Acute Distress] : no acute distress [Well Nourished] : well nourished [Well Developed] : well developed [Well-Appearing] : well-appearing [Normal Appearance] : was normal in appearance [Neck Supple] : was supple [No Respiratory Distress] : no respiratory distress  [No Accessory Muscle Use] : no accessory muscle use [No Rash] : no rash [Alert and Oriented x3] : oriented to person, place, and time [Normal Mood] : the mood was normal [de-identified] : + muscle tightness and mild tenderness to right upper arm, worse with flexion and extension of right arm/shoulder

## 2022-03-21 NOTE — HISTORY OF PRESENT ILLNESS
[FreeTextEntry8] : \par 46 year old female presents c/o 2 week h/o right upper arm pain\par feels like a constant, throbbing pain\par no associated numbness or tingling to her arm or fingers\par does not recall any injury or trauma\par has tried Advil and Tylenol with mild relief \par slowly improving

## 2022-03-21 NOTE — HEALTH RISK ASSESSMENT
[0] : 2) Feeling down, depressed, or hopeless: Not at all (0) [PHQ-2 Negative - No further assessment needed] : PHQ-2 Negative - No further assessment needed [[id number]] : 0

## 2022-03-21 NOTE — ASSESSMENT
[FreeTextEntry1] : Pain to right upper extremity\par - likely muscular in etiology\par - c/w Ibuprofen for pain control\par - recommend heat to affected area\par - will refer patient for ultrasound to r/o DVT\par - advised to see orthopedic sports medicine specialist for further evaluation

## 2022-04-05 ENCOUNTER — APPOINTMENT (OUTPATIENT)
Dept: ORTHOPEDIC SURGERY | Facility: CLINIC | Age: 47
End: 2022-04-05
Payer: COMMERCIAL

## 2022-04-05 ENCOUNTER — NON-APPOINTMENT (OUTPATIENT)
Age: 47
End: 2022-04-05

## 2022-04-05 DIAGNOSIS — M75.01 ADHESIVE CAPSULITIS OF RIGHT SHOULDER: ICD-10-CM

## 2022-04-05 PROCEDURE — 99204 OFFICE O/P NEW MOD 45 MIN: CPT | Mod: 25

## 2022-04-05 PROCEDURE — 20610 DRAIN/INJ JOINT/BURSA W/O US: CPT | Mod: RT

## 2022-04-05 PROCEDURE — 73030 X-RAY EXAM OF SHOULDER: CPT | Mod: RT

## 2022-04-06 NOTE — HISTORY OF PRESENT ILLNESS
[Stable] : stable [___ mths] : [unfilled] month(s) ago [1] : a current pain level of 1/10 [3] : an average pain level of 3/10 [Lifting] : worsened by lifting [Exercise Regimen] : relieved by exercise regimen [Heat] : relieved by heat [NSAIDs] : relieved by nonsteroidal anti-inflammatory drugs [Rest] : relieved by rest [de-identified] : CARLOTTA LOWRY is a 46 year y/o female who presents for initial visit of Right shoulder pain. She reports she has been experiencing R shoulder pain over the lat 1-2 months. Patient reports no specific NIRAV. She reports most pain when trying to lift. She localizes most of her pain over anterior aspect of the shoulder.  Patient denies numbness and tingling to the extremities. She reports taking advil that provides mild to moderate relief. She has not done formal PT for this yet.

## 2022-04-06 NOTE — PHYSICAL EXAM
[de-identified] : Physical Exam:\par General: Well appearing, no acute distress\par Neurologic: A&Ox3, No focal deficits\par Head: NCAT without abrasions, lacerations, or ecchymosis to head, face, or scalp\par Eyes: No scleral icterus, no gross abnormalities\par Respiratory: Equal chest wall expansion bilaterally, no accessory muscle use\par Lymphatic: No lymphadenopathy palpated\par Skin: Warm and dry\par Psychiatric: Normal mood and affect\par \par C-Spine\par Palpation: Tenderness to paraspinal muscles and trapezius muscle\par ROM: side bending, symmetrical. Pain with extension and flexion of the neck.\par Reflexes: C5-7 normal\par \par Right Shoulder  no bt or back \par ·	Inspection/Palpation: no tenderness, swelling or deformities\par ·	Range of Motion: no crepitus with ROM; Active FF 0 - 175; ER at side 0 - 45; IR to L2; Passive FF 0- 140; ER 0- 20 \par -              Arc of Motion: ER to 10 degrees, IR to 5 degrees\par ·	Strength: forward elevation in scapular plane 5/5, internal rotation 4/5, external rotation 4+/5, adduction 5/5 and abduction 4/5 \par ·	Stability: no joint instability on provocative testing\par ·	Tests: Bernal test negative Neer negative positive drop arm test secondary to pain, bear hug test positive, Napolean sign POS, cross arm adduction positive, lift off sign positive, hornblowers sign POS, speeds test positive, Yergason's test positive, Hopper's Active Compression test positive whipple test positive, bicep's load II test negative\par \par Left Shoulder\par ·	Inspection/Palpation: no tenderness, swelling or deformities\par ·	Range of Motion: full and painless in all planes, no crepitus\par ·	Strength: forward elevation in scapular plane 5/5, internal rotation 5/5, external rotation 5/5, adduction 5/5 and abduction 5/5\par ·	Stability: no joint instability on provocative testing\par ·	Tests: Bernal test negative, Neer sign negative, negative drop arm test secondary to pain, bear hug test negative, Napolean sign negative, cross arm adduction negative, lift off sign positive, hornblowers sign negative, speeds test negative, Yergason's test negative, no bicipital groove tenderness, Hopper's Active Compression test negative  [de-identified] : The following radiographs were ordered and read by me during this patients visit. I reviewed each radiograph in detail with the patient and discussed the findings as highlighted below. \par \par 4 views of the Right shoulder show no high riding humeral head, no fracture, dislocation or bony lesions. There is no evidence of degenerative change in the glenohumeral and acromioclavicular joints with maintenance of the joint space. Otherwise unremarkable.

## 2022-04-06 NOTE — ADDENDUM
[FreeTextEntry1] : Documented by Jose Palma acting as a scribe for Dr. Odell on 04/05/2022. \par \par All medical record entries made by the Scribe were at my, Dr. Odell's, direction and\par personally dictated by me on 04/05/2022. I have reviewed the chart and agree that the record\par accurately reflects my personal performance of the history, physical exam, procedure and imaging.

## 2022-04-06 NOTE — DISCUSSION/SUMMARY
[de-identified] : CARLOTTA LOWRY is a 46 year y/o female who presents for initial visit of Right shoulder pain. She reports she has been experiencing R shoulder pain over the lat 1-2 months. Patient reports no specific NIRAV. She reports most pain when trying to lift. She localizes most of her pain over anterior aspect of the shoulder.  Patient denies numbness and tingling to the extremities. She reports taking advil that provides mild to moderate relief. She has not done formal PT for this yet. \par \par Based off of this and her clinical exam I believe her primary complaint to be adhesive capsulitis of which conservative measures are indicated. We discussed the 3 stages of adhesive capsulitis. Stage I is the inflammatory stage where she is in pain. States she was a frozen stage where she has limited range of motion but minimal pain. Stage III is the thawing stage where her range of motion starts to increase.\par \par Pt due to her acute pain and stiffness elected for a corticosteroid injection at today's visit and tolerated the procedure well. She should take it easy for the next 2-3 days while icing the joint and they may start PT in 1 week from today, 2x/week x 6-8 weeks. At that time we will see her for clinical reassessment. Pt agrees to the above plan and all questions were answered.\par  \par \par

## 2022-04-06 NOTE — PROCEDURE
[de-identified] : Injection: Right shoulder (Subacromial). \par Indication: Adhesive capsulitis \par \par A discussion was had with the patient regarding this procedure and all questions were answered. All risks, benefits and alternatives were discussed. These included but were not limited to bleeding, infection, and allergic reaction. Alcohol was used to clean the skin, and betadine was used to sterilize and prep the area in the posterior aspect of the right shoulder. Ethyl chloride spray was then used as a topical anesthetic. A 22-gauge needle was used to inject 3cc 1% xylocaine, 2cc 0.25% bupivacaine and 1cc of 40mg/mL triamcinolone acetonide into the right subacromial space. A sterile bandage was then applied. The patient tolerated the procedure well and there were no complications.

## 2022-06-10 ENCOUNTER — NON-APPOINTMENT (OUTPATIENT)
Age: 47
End: 2022-06-10

## 2022-06-10 ENCOUNTER — APPOINTMENT (OUTPATIENT)
Dept: OPHTHALMOLOGY | Facility: CLINIC | Age: 47
End: 2022-06-10
Payer: COMMERCIAL

## 2022-06-10 PROCEDURE — 92014 COMPRE OPH EXAM EST PT 1/>: CPT

## 2022-09-13 ENCOUNTER — APPOINTMENT (OUTPATIENT)
Dept: HEMATOLOGY ONCOLOGY | Facility: CLINIC | Age: 47
End: 2022-09-13

## 2022-09-19 ENCOUNTER — APPOINTMENT (OUTPATIENT)
Dept: DERMATOLOGY | Facility: CLINIC | Age: 47
End: 2022-09-19

## 2022-09-19 ENCOUNTER — OUTPATIENT (OUTPATIENT)
Dept: OUTPATIENT SERVICES | Facility: HOSPITAL | Age: 47
LOS: 1 days | Discharge: HOME | End: 2022-09-19

## 2022-09-19 DIAGNOSIS — C50.919 MALIGNANT NEOPLASM OF UNSPECIFIED SITE OF UNSPECIFIED FEMALE BREAST: ICD-10-CM

## 2022-09-19 PROCEDURE — 99214 OFFICE O/P EST MOD 30 MIN: CPT

## 2022-10-07 ENCOUNTER — APPOINTMENT (OUTPATIENT)
Dept: HEMATOLOGY ONCOLOGY | Facility: CLINIC | Age: 47
End: 2022-10-07

## 2022-10-10 ENCOUNTER — APPOINTMENT (OUTPATIENT)
Dept: GASTROENTEROLOGY | Facility: CLINIC | Age: 47
End: 2022-10-10

## 2022-10-12 DIAGNOSIS — Z17.0 MALIGNANT NEOPLASM OF UNSPECIFIED SITE OF LEFT FEMALE BREAST: ICD-10-CM

## 2022-10-12 DIAGNOSIS — C50.912 MALIGNANT NEOPLASM OF UNSPECIFIED SITE OF LEFT FEMALE BREAST: ICD-10-CM

## 2022-10-14 ENCOUNTER — APPOINTMENT (OUTPATIENT)
Dept: HEMATOLOGY ONCOLOGY | Facility: CLINIC | Age: 47
End: 2022-10-14

## 2022-10-19 ENCOUNTER — NON-APPOINTMENT (OUTPATIENT)
Age: 47
End: 2022-10-19

## 2022-10-19 ENCOUNTER — APPOINTMENT (OUTPATIENT)
Dept: FAMILY MEDICINE | Facility: CLINIC | Age: 47
End: 2022-10-19

## 2022-10-19 VITALS
HEIGHT: 64 IN | DIASTOLIC BLOOD PRESSURE: 82 MMHG | TEMPERATURE: 97.7 F | WEIGHT: 160 LBS | SYSTOLIC BLOOD PRESSURE: 128 MMHG | HEART RATE: 85 BPM | OXYGEN SATURATION: 97 % | RESPIRATION RATE: 16 BRPM | BODY MASS INDEX: 27.31 KG/M2

## 2022-10-19 VITALS — DIASTOLIC BLOOD PRESSURE: 70 MMHG | SYSTOLIC BLOOD PRESSURE: 100 MMHG

## 2022-10-19 PROCEDURE — 96127 BRIEF EMOTIONAL/BEHAV ASSMT: CPT

## 2022-10-19 PROCEDURE — 93000 ELECTROCARDIOGRAM COMPLETE: CPT

## 2022-10-19 PROCEDURE — 99396 PREV VISIT EST AGE 40-64: CPT | Mod: 25

## 2022-10-19 RX ORDER — MELOXICAM 15 MG/1
15 TABLET ORAL
Qty: 21 | Refills: 0 | Status: DISCONTINUED | COMMUNITY
Start: 2022-04-05 | End: 2022-10-19

## 2022-10-19 RX ORDER — EXEMESTANE 25 MG/1
25 TABLET, FILM COATED ORAL
Refills: 0 | Status: DISCONTINUED | COMMUNITY
End: 2022-10-19

## 2022-10-19 RX ORDER — MUPIROCIN 20 MG/G
2 OINTMENT TOPICAL
Qty: 1 | Refills: 0 | Status: DISCONTINUED | COMMUNITY
Start: 2021-12-20 | End: 2022-10-19

## 2022-10-19 NOTE — PHYSICAL EXAM
[No Acute Distress] : no acute distress [Well Nourished] : well nourished [Well Developed] : well developed [Well-Appearing] : well-appearing [Normal Sclera/Conjunctiva] : normal sclera/conjunctiva [PERRL] : pupils equal round and reactive to light [Normal Outer Ear/Nose] : the outer ears and nose were normal in appearance [Normal TMs] : both tympanic membranes were normal [Normal Appearance] : was normal in appearance [Neck Supple] : was supple [Normal] : the thyroid was normal [No Respiratory Distress] : no respiratory distress  [Clear to Auscultation] : lungs were clear to auscultation bilaterally [Normal Rate] : normal rate  [Regular Rhythm] : with a regular rhythm [Normal S1, S2] : normal S1 and S2 [No Murmur] : no murmur heard [No Edema] : there was no peripheral edema [Soft] : abdomen soft [Non Tender] : non-tender [Normal Bowel Sounds] : normal bowel sounds [Normal Posterior Cervical Nodes] : no posterior cervical lymphadenopathy [Normal Anterior Cervical Nodes] : no anterior cervical lymphadenopathy [No Spinal Tenderness] : no spinal tenderness [Grossly Normal Strength/Tone] : grossly normal strength/tone [No Focal Deficits] : no focal deficits [Normal Affect] : the affect was normal [Alert and Oriented x3] : oriented to person, place, and time [Normal Mood] : the mood was normal

## 2022-10-28 NOTE — HEALTH RISK ASSESSMENT
[Never] : Never [No] : In the past 12 months have you used drugs other than those required for medical reasons? No [0] : 2) Feeling down, depressed, or hopeless: Not at all (0) [PHQ-2 Negative - No further assessment needed] : PHQ-2 Negative - No further assessment needed [Patient reported bone density results were normal] : Patient reported bone density results were normal [Patient reported colonoscopy was normal] : Patient reported colonoscopy was normal [de-identified] : Not exercising regularly  [de-identified] : Diet is generally good [[id number]] : 0 [MammogramComments] : s/p bilateral mastectomy  [PapSmearComments] : s [BoneDensityDate] : 2019 [ColonoscopyDate] : 2019

## 2022-10-28 NOTE — HISTORY OF PRESENT ILLNESS
[FreeTextEntry1] : Annual physical  [de-identified] : \par 47 year old female presents for annual physical \par \par H/o left breast cancer (infiltrating ductal carcinoma), BRCA positive- s/p bilateral mastectomy and breast flap reconstruction surgery, bilateral salpingo- oophorectomy, chemo, radiation, completed course of Exemestane\par \par has HTN- on low dose Lisinopril 2.5 mg daily\par feeling well on medication\par \par scheduled to see GYN in Jan 2023 \par \par has GI appointment scheduled to discuss colonoscopy \par \par received Flu vaccine last month

## 2022-10-28 NOTE — ASSESSMENT
[FreeTextEntry1] : Health care maintenance:\par check blood work\par follow up with optometry/ophthalmology and dentist for routine exams when due\par scheduled to see GYN in Jan 2023\par improve upon diet and exercise\par \par HTN:\par BP is stable\par can try trial off of Lisinopril and monitor BP\par if BP becomes elevated, resume Lisinopril \par see cardiology \par

## 2022-11-09 ENCOUNTER — APPOINTMENT (OUTPATIENT)
Dept: GASTROENTEROLOGY | Facility: CLINIC | Age: 47
End: 2022-11-09

## 2023-01-04 ENCOUNTER — APPOINTMENT (OUTPATIENT)
Dept: CARDIOLOGY | Facility: CLINIC | Age: 48
End: 2023-01-04

## 2023-01-04 ENCOUNTER — APPOINTMENT (OUTPATIENT)
Dept: OBGYN | Facility: CLINIC | Age: 48
End: 2023-01-04
Payer: COMMERCIAL

## 2023-01-04 VITALS
DIASTOLIC BLOOD PRESSURE: 78 MMHG | WEIGHT: 160 LBS | BODY MASS INDEX: 27.31 KG/M2 | SYSTOLIC BLOOD PRESSURE: 114 MMHG | HEIGHT: 64 IN

## 2023-01-04 PROCEDURE — 99396 PREV VISIT EST AGE 40-64: CPT

## 2023-01-04 NOTE — HISTORY OF PRESENT ILLNESS
[FreeTextEntry1] : 47 year old pt  presents for annual exam. Last seen for annual on 2021 with negative PAP and HPV. She had 3 prior c-sections. Pt has a h/o breast CA, BRCA+ tx with bilateral mastectomy in  and oophorectomy. \par Pt is doing well and had no complaints. She is currently taking Vitamin D.\par \par ObHx: c/s x3\par PMHx: breast cancer (BRCA positive)\par PSHx: c/s, tubal ligation, bilateral mastectomy (), BSO tram\par Allergies: PCN [Mammogramdate] : 10/2021 [TextBox_19] : BIRADS-5 [PapSmeardate] : 12/2021 [TextBox_31] : wnl [BoneDensityDate] : 07/2019

## 2023-01-04 NOTE — PLAN
[FreeTextEntry1] : 47 year old pt  presents for annual exam. \par \par -PAP/HPV done today\par -mammo/sono not indicated due to double mastectomy\par -DEXA UTD\par -pt to have colonoscopy later this year\par \par RTO in 1 year

## 2023-01-04 NOTE — END OF VISIT
[FreeTextEntry2] : I, Renetta Ness, acted as a scribe on behalf of Dr. Berna Borrero on 01/04/2023 .\par \par All medical entries made by the scribe were at my, Dr. Berna Borrero, direction and personally dictated by me on 01/04/2023. I have reviewed the chart and agree that the record accurately reflects my personal performance of the history, physical exam, assessment and plan. I have also personally directed, reviewed, and agreed with the chart.\par

## 2023-01-05 LAB — HPV HIGH+LOW RISK DNA PNL CVX: NOT DETECTED

## 2023-01-12 ENCOUNTER — TRANSCRIPTION ENCOUNTER (OUTPATIENT)
Age: 48
End: 2023-01-12

## 2023-01-12 LAB — CYTOLOGY CVX/VAG DOC THIN PREP: ABNORMAL

## 2023-01-20 ENCOUNTER — APPOINTMENT (OUTPATIENT)
Dept: INTERNAL MEDICINE | Facility: CLINIC | Age: 48
End: 2023-01-20
Payer: COMMERCIAL

## 2023-01-20 VITALS
OXYGEN SATURATION: 98 % | RESPIRATION RATE: 16 BRPM | HEART RATE: 70 BPM | HEIGHT: 63.98 IN | SYSTOLIC BLOOD PRESSURE: 130 MMHG | WEIGHT: 162.04 LBS | TEMPERATURE: 97 F | BODY MASS INDEX: 27.66 KG/M2 | DIASTOLIC BLOOD PRESSURE: 91 MMHG

## 2023-01-20 DIAGNOSIS — Z15.09 GENETIC SUSCEPTIBILITY TO MALIGNANT NEOPLASM OF BREAST: ICD-10-CM

## 2023-01-20 DIAGNOSIS — E55.9 VITAMIN D DEFICIENCY, UNSPECIFIED: ICD-10-CM

## 2023-01-20 DIAGNOSIS — Z78.0 ASYMPTOMATIC MENOPAUSAL STATE: ICD-10-CM

## 2023-01-20 DIAGNOSIS — L30.9 DERMATITIS, UNSPECIFIED: ICD-10-CM

## 2023-01-20 DIAGNOSIS — Z15.01 GENETIC SUSCEPTIBILITY TO MALIGNANT NEOPLASM OF BREAST: ICD-10-CM

## 2023-01-20 PROCEDURE — 99204 OFFICE O/P NEW MOD 45 MIN: CPT

## 2023-01-20 RX ORDER — CLOBETASOL PROPIONATE 0.5 MG/G
0.05 OINTMENT TOPICAL
Qty: 1 | Refills: 6 | Status: COMPLETED | COMMUNITY
Start: 2022-09-19 | End: 2023-01-20

## 2023-01-20 RX ORDER — UREA 40 G/100G
40 CREAM TOPICAL
Qty: 1 | Refills: 1 | Status: COMPLETED | COMMUNITY
Start: 2022-09-19 | End: 2023-01-20

## 2023-01-20 NOTE — HISTORY OF PRESENT ILLNESS
[FreeTextEntry8] : Here to establish care with Survivorship Medicine. \par \par Breast Cancer History:\par The patient presented in 2009, at age 34, with a LEFT breast mass with self breast exam.\par Underwent ultrasound guided core biopsy of 2 LEFT breast masses which were positive for infiltrating ductal carcinoma.\par \par Underwent genetic testing and was found to have a deleterious BRCA2 mutation [id number] (5873C> A). \par  \par On November 6, 2009 Dr. Susan Palleschi performed bilateral mastectomy, right sentinel lymph node biopsy and left axillary lymph node dissection. This was followed by placement of saline expanders by Dr. Jacoby Mistry and bilateral salpingo-oophorectomy by Dr. Donald Borrego. The patient's ovaries and fallopian tubes were negative for cancer. \par \par Pathology from the left breast demonstrated a 4.0 x 1.5 cm carcinoma. 90% was invasive ductal carcinoma with [place] score of 7/9. The carcinoma extended to within 0.2 cm from the anterior margin. The cancer was estrogen receptor positive (95%) progesterone receptor positive (90%) and HER-2/cheli negative (1+). \par One of 22 axillary lymph nodes was involved with metastatic disease measuring 1 x 0.4 cm. The right breast and 6 right axillary sentinel lymph nodes were negative for cancer.\par \par Postoperatively the patient received adjuvant chemotherapy. She received 4 cycles of dose dense AC extending from 12/21/2009 through 2/2/2010. Her cumulative dose of Adriamycin was 384 mg (240 mg per meter squared). This was followed by 4 cycles of dose dense Taxol extending from 2/16/2010 through March 30, 2010.\par \par After completion of chemotherapy she received radiation therapy to under the supervision of Dr Shu Card. \par \par On February 29, 2012 the patient had her implants removed and had bilateral NATANAEL flap reconstructions.\par \par The patient initiated therapy with aromatase inhibitors on June 24, 2010. Anastrazole switched to exemestane and pt completed 10 years of endocrine therapy 7/2020.\par \par Disease: StageII IDC, Left Breast. \par Pathology: Moderately differentiated infiltrating ductal carcinoma, ER+ SD+ Her2 cheli - \par TNM stage: T2, N1, M0 \par AJCC Stage: Pathologic prognostic stage IB (AJCC 8 edition) \par \par She last saw her Oncologist in 5/2021 -- Dr. Rodriguez who has since left [facility].  She is not certain she needs to continue to follow with an Oncologist. \par She last saw her PCP in 10/2022 and was referred to Cardiology and also given labs to have checked including TSH, FT4, CMP, UA, CBC w diff, Lipids, and A1C.  She has not yet been able to have these lab tests completed. \par \par Last DEXA scan was completed in 8/2019 which was normal and she was still on aromatase inhibitor therapy at that time. \par She has been getting colonoscopies every 3-4 years, she seen Ophthalmology annually and Dermatology annually.  She wants to know whether she should see Dermatology more often than annually. \par She has a GI appointment in March to discuss colon cancer screening.  She was not aware of pancreatic cancer screening for BRCA mutations but she is going to discuss this with her GI doc as well. \par \par Exercise: she is trying to exercise 5 days a week doing pilates and is trying to walk ~20 minutes every day during her lunch hour. \par Diet: reports eating chocolate and ice cream when she feels stressed and her children are somewhat stressful at the moment so she is now eating more chocolate and ice cream than she wants. \par \par Noticed a 5lb weight gain since 2021: 157lbs - 162lbs (today).\par Denies any difficulty with sleep. \par Does report some pain in her hands and feet at times which she attributes to her aromatase inhibitor therapy.  Sometimes Aleve helps and sometimes it doesn't.  She does not notice any correlation between exercise and pain. \par \par Medical History: \par Hypertension\par BRCA2 mutation\par \par Medications:\par Lisinopril 2.5mg daily\par

## 2023-01-20 NOTE — PLAN
[FreeTextEntry1] : 1) BRCA2 Mutation: \par We discussed the appropriate cancer screenings and surveillance that will need to be completed in the coming months. \par She has an appointment with GI in March and plans to discuss both pancreatic cancer screening by MRI and colon cancer screening by colonoscopy. \par She is also aware that her children will be getting genetic counseling and testing as well but they are ages 23, 20, and 16 and she feels the two older kids should have agency about this decision as well. \par \par 2) h/o Breast Cancer: \par She is currently not having any surveillance imaging completed because of the reconstructive surgery and flap in place.  She is status post double mastectomy and bilateral oophorectomy.  \par \par 3) Screening for osteoporosis:\par Will need to have repeat DEXA scan.  She did receive Zometa during her chemotherapy treatment and her last DEXA scan in 2019 was normal but she was still on aromatase inhibitor therapy at that time and does need a repeat DEXA scan now that she has completed aromatase inhibitor therapy in 2020.  If this repeat scan is normal can consider every 5 year DEXA going forward. \par -- will need to repeat Vitamin D testing today as well. \par \par 4) Hypertension: \par Blood pressure is higher today than in the past and she has restarted her Lisinopril 2.5mg.  We discussed how her BP may have increased because of her 5lb weight gain and discussed dietary modifications she can make to bring down her blood pressure. \par She also has an appointment with Cardiology in February to discuss this as well -- her PCP referred her for hypertension management in the setting her breast cancer history as well. \par -- encouraged her to have her lipids/A1C/CMP testing completed so that this information will be available before her Cardiology appointment next month. \par \par 5) Weight bernardino: \par Discussed dietary modifications to prevent further weight gain. \par Also discussed increasing her exercise intensity since she is has a very busy schedule. \par \par 6) Dermatitis:\par Encouraged her to start with emollient therapy first and will then consider antifungal therapy if needed.

## 2023-01-20 NOTE — PHYSICAL EXAM
[No Acute Distress] : no acute distress [Normal Outer Ear/Nose] : the outer ears and nose were normal in appearance [No JVD] : no jugular venous distention [No Respiratory Distress] : no respiratory distress  [No Accessory Muscle Use] : no accessory muscle use [Clear to Auscultation] : lungs were clear to auscultation bilaterally [Normal Rate] : normal rate  [Regular Rhythm] : with a regular rhythm [Normal S1, S2] : normal S1 and S2 [No Murmur] : no murmur heard [No Carotid Bruits] : no carotid bruits [No Abdominal Bruit] : a ~M bruit was not heard ~T in the abdomen [Pedal Pulses Present] : the pedal pulses are present [No Edema] : there was no peripheral edema [No Extremity Clubbing/Cyanosis] : no extremity clubbing/cyanosis [No Masses] : no palpable masses [No Axillary Lymphadenopathy] : no axillary lymphadenopathy [Soft] : abdomen soft [Non Tender] : non-tender [Non-distended] : non-distended [No HSM] : no HSM [Normal Supraclavicular Nodes] : no supraclavicular lymphadenopathy [Normal Posterior Cervical Nodes] : no posterior cervical lymphadenopathy [Normal Anterior Cervical Nodes] : no anterior cervical lymphadenopathy [No CVA Tenderness] : no CVA  tenderness [No Spinal Tenderness] : no spinal tenderness [Normal] : no CVA or spinal tenderness [No Joint Swelling] : no joint swelling [Grossly Normal Strength/Tone] : grossly normal strength/tone [Coordination Grossly Intact] : coordination grossly intact [No Focal Deficits] : no focal deficits [Normal Gait] : normal gait [Normal Affect] : the affect was normal [Normal Mood] : the mood was normal [Normal Insight/Judgement] : insight and judgment were intact [de-identified] : Noted surgical breast flap and constructed nipple. No palpable masses or lymph nodes.  LEFT axilla with palpable scar tissue likely from lymph node dissection.  [de-identified] : Mild dermatits underneath the LEFT breast and in the LEFT axilla.  No evidence of excorriations.

## 2023-03-05 LAB
ALBUMIN SERPL ELPH-MCNC: 4.6 G/DL
ALP BLD-CCNC: 73 U/L
ALT SERPL-CCNC: 41 U/L
ANION GAP SERPL CALC-SCNC: 11 MMOL/L
APPEARANCE: ABNORMAL
AST SERPL-CCNC: 31 U/L
BACTERIA: NEGATIVE
BASOPHILS # BLD AUTO: 0.07 K/UL
BASOPHILS NFR BLD AUTO: 1 %
BILIRUB SERPL-MCNC: 0.5 MG/DL
BILIRUBIN URINE: NEGATIVE
BLOOD URINE: NEGATIVE
BUN SERPL-MCNC: 15 MG/DL
CALCIUM SERPL-MCNC: 10.4 MG/DL
CHLORIDE SERPL-SCNC: 105 MMOL/L
CHOLEST SERPL-MCNC: 198 MG/DL
CO2 SERPL-SCNC: 24 MMOL/L
COLOR: NORMAL
CREAT SERPL-MCNC: 0.89 MG/DL
EGFR: 79 ML/MIN/1.73M2
EOSINOPHIL # BLD AUTO: 0.15 K/UL
EOSINOPHIL NFR BLD AUTO: 2.1 %
ESTIMATED AVERAGE GLUCOSE: 108 MG/DL
GLUCOSE QUALITATIVE U: NEGATIVE
GLUCOSE SERPL-MCNC: 99 MG/DL
HBA1C MFR BLD HPLC: 5.4 %
HCT VFR BLD CALC: 44.9 %
HDLC SERPL-MCNC: 46 MG/DL
HGB BLD-MCNC: 14.9 G/DL
HYALINE CASTS: 0 /LPF
IMM GRANULOCYTES NFR BLD AUTO: 0.3 %
KETONES URINE: NEGATIVE
LDLC SERPL CALC-MCNC: 136 MG/DL
LEUKOCYTE ESTERASE URINE: NEGATIVE
LYMPHOCYTES # BLD AUTO: 2.5 K/UL
LYMPHOCYTES NFR BLD AUTO: 35.2 %
MAN DIFF?: NORMAL
MCHC RBC-ENTMCNC: 30.2 PG
MCHC RBC-ENTMCNC: 33.2 GM/DL
MCV RBC AUTO: 90.9 FL
MICROSCOPIC-UA: NORMAL
MONOCYTES # BLD AUTO: 0.69 K/UL
MONOCYTES NFR BLD AUTO: 9.7 %
NEUTROPHILS # BLD AUTO: 3.67 K/UL
NEUTROPHILS NFR BLD AUTO: 51.7 %
NITRITE URINE: NEGATIVE
NONHDLC SERPL-MCNC: 153 MG/DL
PH URINE: 6
PLATELET # BLD AUTO: 344 K/UL
POTASSIUM SERPL-SCNC: 4.5 MMOL/L
PROT SERPL-MCNC: 7.2 G/DL
PROTEIN URINE: NEGATIVE
RBC # BLD: 4.94 M/UL
RBC # FLD: 13 %
RED BLOOD CELLS URINE: 0 /HPF
SODIUM SERPL-SCNC: 140 MMOL/L
SPECIFIC GRAVITY URINE: >=1.03
SQUAMOUS EPITHELIAL CELLS: 1 /HPF
T4 FREE SERPL-MCNC: 1 NG/DL
TRIGL SERPL-MCNC: 80 MG/DL
TSH SERPL-ACNC: 4.2 UIU/ML
URINE COMMENTS: NORMAL
UROBILINOGEN URINE: NORMAL
WBC # FLD AUTO: 7.1 K/UL
WHITE BLOOD CELLS URINE: 1 /HPF

## 2023-05-01 ENCOUNTER — NON-APPOINTMENT (OUTPATIENT)
Age: 48
End: 2023-05-01

## 2023-05-01 ENCOUNTER — APPOINTMENT (OUTPATIENT)
Dept: GASTROENTEROLOGY | Facility: CLINIC | Age: 48
End: 2023-05-01
Payer: COMMERCIAL

## 2023-05-01 VITALS
TEMPERATURE: 98.3 F | BODY MASS INDEX: 27.66 KG/M2 | HEART RATE: 86 BPM | OXYGEN SATURATION: 98 % | SYSTOLIC BLOOD PRESSURE: 105 MMHG | HEIGHT: 64 IN | DIASTOLIC BLOOD PRESSURE: 70 MMHG | WEIGHT: 162 LBS

## 2023-05-01 DIAGNOSIS — Z12.11 ENCOUNTER FOR SCREENING FOR MALIGNANT NEOPLASM OF COLON: ICD-10-CM

## 2023-05-01 DIAGNOSIS — Z86.79 PERSONAL HISTORY OF OTHER DISEASES OF THE CIRCULATORY SYSTEM: ICD-10-CM

## 2023-05-01 PROCEDURE — 99203 OFFICE O/P NEW LOW 30 MIN: CPT

## 2023-05-01 NOTE — ASSESSMENT
[FreeTextEntry1] : This is a pleasant 47-year-old female presenting for colon cancer screening evaluation.  I explained to her the risks, alternatives and benefits to a colonoscopy.  Risk including but not limited to bleeding, perforation, infection and adverse medication reaction.  Questions were answered.  She stated understanding.

## 2023-05-01 NOTE — PHYSICAL EXAM

## 2023-05-01 NOTE — HISTORY OF PRESENT ILLNESS
[FreeTextEntry1] : This is a pleasant 47-year-old female with history of breast cancer and hypertension presenting for colon cancer screening evaluation.  Her last colonoscopy was 8 to 10 years ago with benign colon polyp.  She denies family history of colon cancer or colon polyps.  She denies abdominal pain, nausea or vomiting.  She denies changes in bowel habits.  She denies rectal bleeding or melena.  She denies weight loss or anemia.

## 2023-05-12 ENCOUNTER — APPOINTMENT (OUTPATIENT)
Dept: CARDIOLOGY | Facility: CLINIC | Age: 48
End: 2023-05-12
Payer: COMMERCIAL

## 2023-05-12 ENCOUNTER — NON-APPOINTMENT (OUTPATIENT)
Age: 48
End: 2023-05-12

## 2023-05-12 VITALS
DIASTOLIC BLOOD PRESSURE: 62 MMHG | BODY MASS INDEX: 24.75 KG/M2 | SYSTOLIC BLOOD PRESSURE: 110 MMHG | WEIGHT: 145 LBS | HEART RATE: 80 BPM | OXYGEN SATURATION: 98 % | HEIGHT: 64 IN

## 2023-05-12 PROCEDURE — 99204 OFFICE O/P NEW MOD 45 MIN: CPT

## 2023-05-12 PROCEDURE — 93000 ELECTROCARDIOGRAM COMPLETE: CPT

## 2023-05-12 NOTE — HISTORY OF PRESENT ILLNESS
[FreeTextEntry1] : Pt is a 48 y/o F who is referred here today by their PCP for evaluation.  Pt has PMH HTN, HLD, breast Ca 2009 infiltrative ductal carcinoma s/p b/l mastectomy, chemo (adriamycin, Taxol), RXT\par Pt reports feeling well and has no active cardiac complaints - denies CP, SOB, palpitations, dizziness, syncope, edema, orthopnea, PND, orthopnea.  No exertional symptoms. \par \par Family hx: no cardiac disease immediate family\par Smoking status: never\par no ETOH\par no drug use\par Current exercise: nightly walks\par Daily water intake: none\par Daily caffeine intake: pepsi x1, snapple x1\par OTC medications: seasonal allergy medication, MVI\par allergy - PCN - hives\par Previous cardiac testing: none\par Previous hospitalizations: mastectomy 2009 - no problems with anesthesia\par 3 children - no problem with pregnancies\par

## 2023-05-12 NOTE — DISCUSSION/SUMMARY
[FreeTextEntry1] : Pt is a 46 y/o F who is referred here today by their PCP for evaluation.  Pt has PMH HTN, HLD, breast Ca 2009 infiltrative ductal carcinoma s/p b/l mastectomy, chemo (adriamycin, Taxol), RXT\par \par \par HTN:\par well controlled\par c/w lisinopril\par Advised low salt diet, regular exercise, weight loss\par Encouraged pt to check BP at home and keep journal \par Will check transthoracic echocardiogram to evaluate left ventricular function and assess for any structural abnormalities\par will perform ETT to assess patient's current cardiac reserve to incremental activity and check for provocable ECG changes.\par \par HLD:\par Advise lifestyle modifications \par \par Most recent available lab results were reviewed with pt.\par The described plan was discussed with the pt.  All questions and concerns were addressed to the best of my knowledge. \par

## 2023-05-12 NOTE — REVIEW OF SYSTEMS
[Negative] : Heme/Lymph S Plasty Text: Given the location and shape of the defect, and the orientation of relaxed skin tension lines, an S-plasty was deemed most appropriate for repair.  Using a sterile surgical marker, the appropriate outline of the S-plasty was drawn, incorporating the defect and placing the expected incisions within the relaxed skin tension lines where possible.  The area thus outlined was incised deep to adipose tissue with a #15 scalpel blade.  The skin margins were undermined to an appropriate distance in all directions utilizing iris scissors. The skin flaps were advanced over the defect.  The opposing margins were then approximated with interrupted buried subcutaneous sutures.

## 2023-06-13 ENCOUNTER — APPOINTMENT (OUTPATIENT)
Dept: CARDIOLOGY | Facility: CLINIC | Age: 48
End: 2023-06-13
Payer: COMMERCIAL

## 2023-06-13 PROCEDURE — 93306 TTE W/DOPPLER COMPLETE: CPT

## 2023-06-13 PROCEDURE — 93015 CV STRESS TEST SUPVJ I&R: CPT

## 2023-06-19 ENCOUNTER — NON-APPOINTMENT (OUTPATIENT)
Age: 48
End: 2023-06-19

## 2023-06-19 ENCOUNTER — APPOINTMENT (OUTPATIENT)
Dept: OPHTHALMOLOGY | Facility: CLINIC | Age: 48
End: 2023-06-19
Payer: COMMERCIAL

## 2023-06-19 PROCEDURE — 92014 COMPRE OPH EXAM EST PT 1/>: CPT

## 2023-07-21 ENCOUNTER — APPOINTMENT (OUTPATIENT)
Dept: GASTROENTEROLOGY | Facility: AMBULATORY MEDICAL SERVICES | Age: 48
End: 2023-07-21
Payer: COMMERCIAL

## 2023-07-21 PROCEDURE — 45378 DIAGNOSTIC COLONOSCOPY: CPT | Mod: 33

## 2023-11-15 ENCOUNTER — RX RENEWAL (OUTPATIENT)
Age: 48
End: 2023-11-15

## 2023-11-27 ENCOUNTER — APPOINTMENT (OUTPATIENT)
Dept: FAMILY MEDICINE | Facility: CLINIC | Age: 48
End: 2023-11-27
Payer: COMMERCIAL

## 2023-11-27 VITALS
DIASTOLIC BLOOD PRESSURE: 80 MMHG | SYSTOLIC BLOOD PRESSURE: 116 MMHG | OXYGEN SATURATION: 98 % | BODY MASS INDEX: 26.46 KG/M2 | HEART RATE: 77 BPM | TEMPERATURE: 97.2 F | HEIGHT: 64 IN | WEIGHT: 155 LBS

## 2023-11-27 DIAGNOSIS — Z13.31 ENCOUNTER FOR SCREENING FOR DEPRESSION: ICD-10-CM

## 2023-11-27 DIAGNOSIS — Z00.00 ENCOUNTER FOR GENERAL ADULT MEDICAL EXAMINATION W/OUT ABNORMAL FINDINGS: ICD-10-CM

## 2023-11-27 DIAGNOSIS — I10 ESSENTIAL (PRIMARY) HYPERTENSION: ICD-10-CM

## 2023-11-27 PROCEDURE — 96127 BRIEF EMOTIONAL/BEHAV ASSMT: CPT

## 2023-11-27 PROCEDURE — 99396 PREV VISIT EST AGE 40-64: CPT | Mod: 25

## 2023-11-27 RX ORDER — SODIUM PICOSULFATE, MAGNESIUM OXIDE, AND ANHYDROUS CITRIC ACID 10; 3.5; 12 MG/160ML; G/160ML; G/160ML
10-3.5-12 MG-GM LIQUID ORAL
Qty: 1 | Refills: 0 | Status: DISCONTINUED | COMMUNITY
Start: 2023-05-01 | End: 2023-11-27

## 2024-01-04 ENCOUNTER — APPOINTMENT (OUTPATIENT)
Dept: OBGYN | Facility: CLINIC | Age: 49
End: 2024-01-04
Payer: COMMERCIAL

## 2024-01-04 VITALS
HEIGHT: 64 IN | WEIGHT: 153 LBS | HEART RATE: 93 BPM | DIASTOLIC BLOOD PRESSURE: 72 MMHG | BODY MASS INDEX: 26.12 KG/M2 | SYSTOLIC BLOOD PRESSURE: 121 MMHG

## 2024-01-04 DIAGNOSIS — Z01.419 ENCOUNTER FOR GYNECOLOGICAL EXAMINATION (GENERAL) (ROUTINE) W/OUT ABNORMAL FINDINGS: ICD-10-CM

## 2024-01-04 DIAGNOSIS — Z13.820 ENCOUNTER FOR SCREENING FOR OSTEOPOROSIS: ICD-10-CM

## 2024-01-04 PROCEDURE — 99396 PREV VISIT EST AGE 40-64: CPT

## 2024-01-04 NOTE — PHYSICAL EXAM
[Appropriately responsive] : appropriately responsive [Alert] : alert [No Acute Distress] : no acute distress [Soft] : soft [Non-tender] : non-tender [Non-distended] : non-distended [No HSM] : No HSM [No Lesions] : no lesions [No Mass] : no mass [Oriented x3] : oriented x3 [Labia Majora] : normal [Labia Minora] : normal [Normal] : normal [Uterine Adnexae] : absent

## 2024-01-07 PROBLEM — Z01.419 WOMEN'S ANNUAL ROUTINE GYNECOLOGICAL EXAMINATION: Status: ACTIVE | Noted: 2021-12-02

## 2024-01-07 LAB — HPV HIGH+LOW RISK DNA PNL CVX: NOT DETECTED

## 2024-01-07 NOTE — END OF VISIT
[FreeTextEntry2] : I, Renetta Thi, acted as a scribe on behalf of Dr. Berna Borrero on 01/04/2024 .  All medical entries made by the scribe were at my, Dr. Berna Borrero, direction and personally dictated by me on 01/04/2024. I have reviewed the chart and agree that the record accurately reflects my personal performance of the history, physical exam, assessment and plan. I have also personally directed, reviewed, and agreed with the chart.

## 2024-01-07 NOTE — PLAN
[FreeTextEntry1] : 49 y/o for annual exam.   - PAP done today - mammo not indicated - referred for DEXA - pt UTD with colonoscopy - f/u in 1 year

## 2024-01-07 NOTE — HISTORY OF PRESENT ILLNESS
[FreeTextEntry1] : 49 y/o  postmenopausal (since  due to BSO) pt presenting for annual exam. Last seen for annual in 2023. Negative PAP and HPV last year. She had 3 prior c-sections. Pt has a h/o breast CA, BRCA+ tx with bilateral mastectomy in  and oophorectomy. She had a colonoscopy in 2023. Last bone density scan was in 2019. Pt taking Ca2+ and Vit D. She is doing well and has no complaints.  ObHx: c/s x3 PMHx: breast cancer (BRCA positive) PSHx: c/s, tubal ligation, bilateral mastectomy (), BSO tram Allergies: PCN [Mammogramdate] : not indicated [PapSmeardate] : 01/2023 [TextBox_31] : wnl [BoneDensityDate] : 07/2019 [TextBox_37] : rosario, f/u 3-5 years [ColonoscopyDate] : 07/2023 [TextBox_43] : rosario, f/u 10 year

## 2024-01-08 LAB — CYTOLOGY CVX/VAG DOC THIN PREP: ABNORMAL

## 2024-01-29 LAB
25(OH)D3 SERPL-MCNC: 45.7 NG/ML
ALBUMIN SERPL ELPH-MCNC: 4.7 G/DL
ALP BLD-CCNC: 74 U/L
ALT SERPL-CCNC: 28 U/L
ANION GAP SERPL CALC-SCNC: 18 MMOL/L
APPEARANCE: CLEAR
AST SERPL-CCNC: 27 U/L
BACTERIA: NEGATIVE /HPF
BASOPHILS # BLD AUTO: 0.06 K/UL
BASOPHILS NFR BLD AUTO: 0.8 %
BILIRUB SERPL-MCNC: 0.2 MG/DL
BILIRUBIN URINE: NEGATIVE
BLOOD URINE: NEGATIVE
BUN SERPL-MCNC: 12 MG/DL
CALCIUM SERPL-MCNC: 9.7 MG/DL
CAST: 0 /LPF
CHLORIDE SERPL-SCNC: 104 MMOL/L
CHOLEST SERPL-MCNC: 216 MG/DL
CO2 SERPL-SCNC: 19 MMOL/L
COLOR: YELLOW
CREAT SERPL-MCNC: 0.79 MG/DL
EGFR: 91 ML/MIN/1.73M2
EOSINOPHIL # BLD AUTO: 0.2 K/UL
EOSINOPHIL NFR BLD AUTO: 2.6 %
EPITHELIAL CELLS: 1 /HPF
ESTIMATED AVERAGE GLUCOSE: 103 MG/DL
FOLATE SERPL-MCNC: 17.1 NG/ML
GLUCOSE QUALITATIVE U: NEGATIVE MG/DL
GLUCOSE SERPL-MCNC: 96 MG/DL
HBA1C MFR BLD HPLC: 5.2 %
HCT VFR BLD CALC: 42.6 %
HDLC SERPL-MCNC: 46 MG/DL
HGB BLD-MCNC: 14.2 G/DL
IMM GRANULOCYTES NFR BLD AUTO: 0.3 %
KETONES URINE: NEGATIVE MG/DL
LDLC SERPL CALC-MCNC: 140 MG/DL
LEUKOCYTE ESTERASE URINE: ABNORMAL
LYMPHOCYTES # BLD AUTO: 2.82 K/UL
LYMPHOCYTES NFR BLD AUTO: 37.3 %
MAN DIFF?: NORMAL
MCHC RBC-ENTMCNC: 29.8 PG
MCHC RBC-ENTMCNC: 33.3 GM/DL
MCV RBC AUTO: 89.5 FL
MICROSCOPIC-UA: NORMAL
MONOCYTES # BLD AUTO: 0.69 K/UL
MONOCYTES NFR BLD AUTO: 9.1 %
NEUTROPHILS # BLD AUTO: 3.77 K/UL
NEUTROPHILS NFR BLD AUTO: 49.9 %
NITRITE URINE: NEGATIVE
NONHDLC SERPL-MCNC: 169 MG/DL
PH URINE: 6
PLATELET # BLD AUTO: 311 K/UL
POTASSIUM SERPL-SCNC: 4.3 MMOL/L
PROT SERPL-MCNC: 7.1 G/DL
PROTEIN URINE: NEGATIVE MG/DL
RBC # BLD: 4.76 M/UL
RBC # FLD: 13 %
RED BLOOD CELLS URINE: 0 /HPF
SODIUM SERPL-SCNC: 140 MMOL/L
SPECIFIC GRAVITY URINE: 1.01
T4 FREE SERPL-MCNC: 1 NG/DL
THYROGLOB AB SERPL-ACNC: <20 IU/ML
THYROPEROXIDASE AB SERPL IA-ACNC: <10 IU/ML
TRIGL SERPL-MCNC: 163 MG/DL
TSH SERPL-ACNC: 5.3 UIU/ML
URATE SERPL-MCNC: 4.5 MG/DL
UROBILINOGEN URINE: 0.2 MG/DL
VIT B12 SERPL-MCNC: 853 PG/ML
WBC # FLD AUTO: 7.56 K/UL
WHITE BLOOD CELLS URINE: 1 /HPF

## 2024-01-30 ENCOUNTER — APPOINTMENT (OUTPATIENT)
Dept: FAMILY MEDICINE | Facility: CLINIC | Age: 49
End: 2024-01-30
Payer: COMMERCIAL

## 2024-01-30 DIAGNOSIS — E78.5 HYPERLIPIDEMIA, UNSPECIFIED: ICD-10-CM

## 2024-01-30 DIAGNOSIS — R79.89 OTHER SPECIFIED ABNORMAL FINDINGS OF BLOOD CHEMISTRY: ICD-10-CM

## 2024-01-30 DIAGNOSIS — Z71.2 PERSON CONSULTING FOR EXPLANATION OF EXAMINATION OR TEST FINDINGS: ICD-10-CM

## 2024-01-30 PROCEDURE — 99213 OFFICE O/P EST LOW 20 MIN: CPT | Mod: 95

## 2024-01-30 NOTE — PHYSICAL EXAM
[No Acute Distress] : no acute distress [Well Nourished] : well nourished [Well Developed] : well developed [Well-Appearing] : well-appearing [Normal Appearance] : was normal in appearance [No Respiratory Distress] : no respiratory distress  [No Accessory Muscle Use] : no accessory muscle use [Alert and Oriented x3] : oriented to person, place, and time

## 2024-01-30 NOTE — HISTORY OF PRESENT ILLNESS
[Home] : at home, [unfilled] , at the time of the visit. [Medical Office: ([facility])___] : at the medical office located in  [Verbal consent obtained from patient] : the patient, [unfilled] [FreeTextEntry1] : Follow up- review results [de-identified] :  48 year old female presents via telehealth to review recent blood work results. She currently feels well.

## 2024-01-30 NOTE — PLAN
[FreeTextEntry1] : reviewed blood work results with patient TSH mildly elevated, free T4 normal- patient asymptomatic, will monitor for now Mildly elevated total cholesterol, LDL elevated- focus on diet and exercise Other results are normal

## 2024-02-12 ENCOUNTER — APPOINTMENT (OUTPATIENT)
Dept: RADIOLOGY | Facility: IMAGING CENTER | Age: 49
End: 2024-02-12

## 2024-03-18 ENCOUNTER — APPOINTMENT (OUTPATIENT)
Dept: DERMATOLOGY | Facility: CLINIC | Age: 49
End: 2024-03-18
Payer: COMMERCIAL

## 2024-03-18 DIAGNOSIS — L57.0 ACTINIC KERATOSIS: ICD-10-CM

## 2024-03-18 DIAGNOSIS — D22.9 MELANOCYTIC NEVI, UNSPECIFIED: ICD-10-CM

## 2024-03-18 DIAGNOSIS — Z12.83 ENCOUNTER FOR SCREENING FOR MALIGNANT NEOPLASM OF SKIN: ICD-10-CM

## 2024-03-18 DIAGNOSIS — L85.3 XEROSIS CUTIS: ICD-10-CM

## 2024-03-18 PROCEDURE — 17000 DESTRUCT PREMALG LESION: CPT

## 2024-03-18 PROCEDURE — 99213 OFFICE O/P EST LOW 20 MIN: CPT | Mod: 25

## 2024-04-11 ENCOUNTER — RX RENEWAL (OUTPATIENT)
Age: 49
End: 2024-04-11

## 2024-05-06 ENCOUNTER — APPOINTMENT (OUTPATIENT)
Dept: RADIOLOGY | Facility: IMAGING CENTER | Age: 49
End: 2024-05-06

## 2024-05-31 ENCOUNTER — APPOINTMENT (OUTPATIENT)
Dept: RADIOLOGY | Facility: IMAGING CENTER | Age: 49
End: 2024-05-31

## 2024-06-07 ENCOUNTER — TRANSCRIPTION ENCOUNTER (OUTPATIENT)
Age: 49
End: 2024-06-07

## 2024-07-10 ENCOUNTER — RX RENEWAL (OUTPATIENT)
Age: 49
End: 2024-07-10

## 2024-09-23 ENCOUNTER — NON-APPOINTMENT (OUTPATIENT)
Age: 49
End: 2024-09-23

## 2024-09-23 ENCOUNTER — APPOINTMENT (OUTPATIENT)
Dept: OPHTHALMOLOGY | Facility: CLINIC | Age: 49
End: 2024-09-23
Payer: COMMERCIAL

## 2024-09-23 PROCEDURE — 92014 COMPRE OPH EXAM EST PT 1/>: CPT

## 2024-10-08 ENCOUNTER — RX RENEWAL (OUTPATIENT)
Age: 49
End: 2024-10-08

## 2024-12-14 ENCOUNTER — APPOINTMENT (OUTPATIENT)
Dept: FAMILY MEDICINE | Facility: CLINIC | Age: 49
End: 2024-12-14
Payer: COMMERCIAL

## 2024-12-14 VITALS
HEIGHT: 64 IN | BODY MASS INDEX: 26.98 KG/M2 | OXYGEN SATURATION: 98 % | HEART RATE: 79 BPM | DIASTOLIC BLOOD PRESSURE: 72 MMHG | WEIGHT: 158 LBS | TEMPERATURE: 97.2 F | SYSTOLIC BLOOD PRESSURE: 118 MMHG

## 2024-12-14 DIAGNOSIS — I10 ESSENTIAL (PRIMARY) HYPERTENSION: ICD-10-CM

## 2024-12-14 DIAGNOSIS — E78.5 HYPERLIPIDEMIA, UNSPECIFIED: ICD-10-CM

## 2024-12-14 DIAGNOSIS — R79.89 OTHER SPECIFIED ABNORMAL FINDINGS OF BLOOD CHEMISTRY: ICD-10-CM

## 2024-12-14 DIAGNOSIS — Z00.00 ENCOUNTER FOR GENERAL ADULT MEDICAL EXAMINATION W/OUT ABNORMAL FINDINGS: ICD-10-CM

## 2024-12-14 PROCEDURE — 99396 PREV VISIT EST AGE 40-64: CPT

## 2025-01-17 ENCOUNTER — APPOINTMENT (OUTPATIENT)
Dept: PLASTIC SURGERY | Facility: CLINIC | Age: 50
End: 2025-01-17
Payer: COMMERCIAL

## 2025-01-17 VITALS
OXYGEN SATURATION: 99 % | DIASTOLIC BLOOD PRESSURE: 78 MMHG | WEIGHT: 150 LBS | TEMPERATURE: 97.9 F | SYSTOLIC BLOOD PRESSURE: 113 MMHG | HEART RATE: 87 BPM | HEIGHT: 64 IN | BODY MASS INDEX: 25.61 KG/M2

## 2025-01-17 DIAGNOSIS — Z17.0 MALIGNANT NEOPLASM OF UNSPECIFIED SITE OF LEFT FEMALE BREAST: ICD-10-CM

## 2025-01-17 DIAGNOSIS — Z15.01 GENETIC SUSCEPTIBILITY TO MALIGNANT NEOPLASM OF BREAST: ICD-10-CM

## 2025-01-17 DIAGNOSIS — Z15.09 GENETIC SUSCEPTIBILITY TO MALIGNANT NEOPLASM OF BREAST: ICD-10-CM

## 2025-01-17 DIAGNOSIS — C50.912 MALIGNANT NEOPLASM OF UNSPECIFIED SITE OF LEFT FEMALE BREAST: ICD-10-CM

## 2025-01-17 PROCEDURE — 99243 OFF/OP CNSLTJ NEW/EST LOW 30: CPT

## 2025-01-23 DIAGNOSIS — Z15.09 GENETIC SUSCEPTIBILITY TO OTHER MALIGNANT NEOPLASM: ICD-10-CM

## 2025-02-08 ENCOUNTER — APPOINTMENT (OUTPATIENT)
Dept: MRI IMAGING | Facility: CLINIC | Age: 50
End: 2025-02-08

## 2025-02-08 PROCEDURE — A9585: CPT

## 2025-02-08 PROCEDURE — 74183 MRI ABD W/O CNTR FLWD CNTR: CPT

## 2025-02-18 ENCOUNTER — APPOINTMENT (OUTPATIENT)
Dept: SURGICAL ONCOLOGY | Facility: CLINIC | Age: 50
End: 2025-02-18
Payer: COMMERCIAL

## 2025-02-18 PROCEDURE — 99205 OFFICE O/P NEW HI 60 MIN: CPT

## 2025-03-05 ENCOUNTER — NON-APPOINTMENT (OUTPATIENT)
Age: 50
End: 2025-03-05

## 2025-03-05 ENCOUNTER — APPOINTMENT (OUTPATIENT)
Dept: PHYSICAL MEDICINE AND REHAB | Facility: CLINIC | Age: 50
End: 2025-03-05
Payer: COMMERCIAL

## 2025-03-05 DIAGNOSIS — R60.9 EDEMA, UNSPECIFIED: ICD-10-CM

## 2025-03-05 DIAGNOSIS — I89.0 LYMPHEDEMA, NOT ELSEWHERE CLASSIFIED: ICD-10-CM

## 2025-03-05 DIAGNOSIS — Z91.89 OTHER SPECIFIED PERSONAL RISK FACTORS, NOT ELSEWHERE CLASSIFIED: ICD-10-CM

## 2025-03-05 PROCEDURE — 99204 OFFICE O/P NEW MOD 45 MIN: CPT

## 2025-03-15 ENCOUNTER — APPOINTMENT (OUTPATIENT)
Dept: ULTRASOUND IMAGING | Facility: CLINIC | Age: 50
End: 2025-03-15
Payer: COMMERCIAL

## 2025-03-15 PROCEDURE — 93971 EXTREMITY STUDY: CPT | Mod: RT

## 2025-03-17 ENCOUNTER — APPOINTMENT (OUTPATIENT)
Dept: DERMATOLOGY | Facility: CLINIC | Age: 50
End: 2025-03-17
Payer: COMMERCIAL

## 2025-03-17 PROCEDURE — 99213 OFFICE O/P EST LOW 20 MIN: CPT

## 2025-04-04 ENCOUNTER — RX RENEWAL (OUTPATIENT)
Age: 50
End: 2025-04-04

## 2025-04-17 ENCOUNTER — NON-APPOINTMENT (OUTPATIENT)
Age: 50
End: 2025-04-17

## 2025-04-19 ENCOUNTER — APPOINTMENT (OUTPATIENT)
Dept: FAMILY MEDICINE | Facility: CLINIC | Age: 50
End: 2025-04-19
Payer: COMMERCIAL

## 2025-04-19 VITALS
DIASTOLIC BLOOD PRESSURE: 72 MMHG | HEART RATE: 71 BPM | WEIGHT: 156 LBS | HEIGHT: 64 IN | OXYGEN SATURATION: 97 % | SYSTOLIC BLOOD PRESSURE: 114 MMHG | TEMPERATURE: 97.4 F | BODY MASS INDEX: 26.63 KG/M2

## 2025-04-19 DIAGNOSIS — R19.7 DIARRHEA, UNSPECIFIED: ICD-10-CM

## 2025-04-19 DIAGNOSIS — R15.2 FECAL URGENCY: ICD-10-CM

## 2025-04-19 PROCEDURE — G2211 COMPLEX E/M VISIT ADD ON: CPT

## 2025-04-19 PROCEDURE — 99213 OFFICE O/P EST LOW 20 MIN: CPT

## 2025-04-28 ENCOUNTER — APPOINTMENT (OUTPATIENT)
Dept: GASTROENTEROLOGY | Facility: CLINIC | Age: 50
End: 2025-04-28
Payer: COMMERCIAL

## 2025-04-28 VITALS
OXYGEN SATURATION: 97 % | HEART RATE: 82 BPM | WEIGHT: 155 LBS | SYSTOLIC BLOOD PRESSURE: 120 MMHG | DIASTOLIC BLOOD PRESSURE: 70 MMHG | TEMPERATURE: 97.9 F | HEIGHT: 64 IN | BODY MASS INDEX: 26.46 KG/M2

## 2025-04-28 DIAGNOSIS — R15.2 FECAL URGENCY: ICD-10-CM

## 2025-04-28 DIAGNOSIS — R19.7 DIARRHEA, UNSPECIFIED: ICD-10-CM

## 2025-04-28 PROCEDURE — 99214 OFFICE O/P EST MOD 30 MIN: CPT

## 2025-04-30 ENCOUNTER — APPOINTMENT (OUTPATIENT)
Dept: PHYSICAL MEDICINE AND REHAB | Facility: CLINIC | Age: 50
End: 2025-04-30
Payer: COMMERCIAL

## 2025-04-30 DIAGNOSIS — G89.28 OTHER CHRONIC POSTPROCEDURAL PAIN: ICD-10-CM

## 2025-04-30 DIAGNOSIS — Z91.89 OTHER SPECIFIED PERSONAL RISK FACTORS, NOT ELSEWHERE CLASSIFIED: ICD-10-CM

## 2025-04-30 DIAGNOSIS — I89.0 LYMPHEDEMA, NOT ELSEWHERE CLASSIFIED: ICD-10-CM

## 2025-04-30 PROCEDURE — 99214 OFFICE O/P EST MOD 30 MIN: CPT

## 2025-05-14 ENCOUNTER — APPOINTMENT (OUTPATIENT)
Dept: OBGYN | Facility: CLINIC | Age: 50
End: 2025-05-14
Payer: COMMERCIAL

## 2025-05-14 VITALS
SYSTOLIC BLOOD PRESSURE: 113 MMHG | HEIGHT: 64 IN | WEIGHT: 151 LBS | DIASTOLIC BLOOD PRESSURE: 82 MMHG | BODY MASS INDEX: 25.78 KG/M2

## 2025-05-14 DIAGNOSIS — Z01.419 ENCOUNTER FOR GYNECOLOGICAL EXAMINATION (GENERAL) (ROUTINE) W/OUT ABNORMAL FINDINGS: ICD-10-CM

## 2025-05-14 PROCEDURE — 99396 PREV VISIT EST AGE 40-64: CPT

## 2025-05-19 ENCOUNTER — TRANSCRIPTION ENCOUNTER (OUTPATIENT)
Age: 50
End: 2025-05-19

## 2025-05-19 LAB
CYTOLOGY CVX/VAG DOC THIN PREP: ABNORMAL
HPV HIGH+LOW RISK DNA PNL CVX: NOT DETECTED

## 2025-06-17 ENCOUNTER — APPOINTMENT (OUTPATIENT)
Dept: GASTROENTEROLOGY | Facility: CLINIC | Age: 50
End: 2025-06-17

## 2025-06-27 ENCOUNTER — RX RENEWAL (OUTPATIENT)
Age: 50
End: 2025-06-27

## 2025-07-10 ENCOUNTER — APPOINTMENT (OUTPATIENT)
Dept: INTERNAL MEDICINE | Facility: CLINIC | Age: 50
End: 2025-07-10
Payer: COMMERCIAL

## 2025-07-10 VITALS
DIASTOLIC BLOOD PRESSURE: 78 MMHG | HEART RATE: 83 BPM | OXYGEN SATURATION: 97 % | RESPIRATION RATE: 16 BRPM | WEIGHT: 149.89 LBS | TEMPERATURE: 97.3 F | SYSTOLIC BLOOD PRESSURE: 111 MMHG | BODY MASS INDEX: 25.73 KG/M2

## 2025-07-10 PROBLEM — H93.13 SUBJECTIVE TINNITUS OF BOTH EARS: Status: ACTIVE | Noted: 2025-07-10

## 2025-07-10 PROBLEM — M25.50 ARTHRALGIA, UNSPECIFIED JOINT: Status: ACTIVE | Noted: 2025-07-10

## 2025-07-10 PROCEDURE — 99215 OFFICE O/P EST HI 40 MIN: CPT

## 2025-07-10 RX ORDER — FLUTICASONE PROPIONATE 50 UG/1
50 SPRAY NASAL
Qty: 16 | Refills: 3 | Status: ACTIVE | COMMUNITY
Start: 2025-07-10 | End: 1900-01-01

## 2025-07-12 ENCOUNTER — APPOINTMENT (OUTPATIENT)
Dept: RADIOLOGY | Facility: CLINIC | Age: 50
End: 2025-07-12
Payer: COMMERCIAL

## 2025-07-12 ENCOUNTER — OUTPATIENT (OUTPATIENT)
Dept: OUTPATIENT SERVICES | Facility: HOSPITAL | Age: 50
LOS: 1 days | End: 2025-07-12
Payer: COMMERCIAL

## 2025-07-12 DIAGNOSIS — Z13.820 ENCOUNTER FOR SCREENING FOR OSTEOPOROSIS: ICD-10-CM

## 2025-07-12 PROCEDURE — 77080 DXA BONE DENSITY AXIAL: CPT

## 2025-07-12 PROCEDURE — 77080 DXA BONE DENSITY AXIAL: CPT | Mod: 26

## 2025-07-25 ENCOUNTER — TRANSCRIPTION ENCOUNTER (OUTPATIENT)
Age: 50
End: 2025-07-25